# Patient Record
Sex: FEMALE | Race: WHITE | NOT HISPANIC OR LATINO | Employment: OTHER | URBAN - METROPOLITAN AREA
[De-identification: names, ages, dates, MRNs, and addresses within clinical notes are randomized per-mention and may not be internally consistent; named-entity substitution may affect disease eponyms.]

---

## 2023-04-21 ENCOUNTER — TELEPHONE (OUTPATIENT)
Dept: PRIMARY CARE | Facility: CLINIC | Age: 73
End: 2023-04-21

## 2023-04-24 ENCOUNTER — OFFICE VISIT (OUTPATIENT)
Dept: PRIMARY CARE | Facility: CLINIC | Age: 73
End: 2023-04-24
Payer: MEDICARE

## 2023-04-24 VITALS
OXYGEN SATURATION: 97 % | SYSTOLIC BLOOD PRESSURE: 178 MMHG | HEART RATE: 80 BPM | BODY MASS INDEX: 28.53 KG/M2 | WEIGHT: 156 LBS | DIASTOLIC BLOOD PRESSURE: 88 MMHG

## 2023-04-24 DIAGNOSIS — I10 PRIMARY HYPERTENSION: ICD-10-CM

## 2023-04-24 DIAGNOSIS — N18.31 STAGE 3A CHRONIC KIDNEY DISEASE (MULTI): Primary | ICD-10-CM

## 2023-04-24 PROBLEM — M81.0 POST-MENOPAUSAL OSTEOPOROSIS: Status: ACTIVE | Noted: 2023-04-24

## 2023-04-24 PROBLEM — F43.21 SITUATIONAL DEPRESSION: Status: ACTIVE | Noted: 2023-04-24

## 2023-04-24 PROBLEM — R01.1 CARDIAC MURMUR: Status: ACTIVE | Noted: 2023-04-24

## 2023-04-24 PROBLEM — E78.5 BORDERLINE HYPERLIPIDEMIA: Status: ACTIVE | Noted: 2023-04-24

## 2023-04-24 PROBLEM — N95.1 HOT FLASHES, MENOPAUSAL: Status: ACTIVE | Noted: 2023-04-24

## 2023-04-24 LAB
ALBUMIN (G/DL) IN SER/PLAS: 4.7 G/DL (ref 3.4–5)
ANION GAP IN SER/PLAS: 13 MMOL/L (ref 10–20)
CALCIUM (MG/DL) IN SER/PLAS: 10.3 MG/DL (ref 8.6–10.6)
CARBON DIOXIDE, TOTAL (MMOL/L) IN SER/PLAS: 29 MMOL/L (ref 21–32)
CHLORIDE (MMOL/L) IN SER/PLAS: 102 MMOL/L (ref 98–107)
CREATININE (MG/DL) IN SER/PLAS: 0.97 MG/DL (ref 0.5–1.05)
GFR FEMALE: 62 ML/MIN/1.73M2
GLUCOSE (MG/DL) IN SER/PLAS: 104 MG/DL (ref 74–99)
PHOSPHATE (MG/DL) IN SER/PLAS: 3.3 MG/DL (ref 2.5–4.9)
POTASSIUM (MMOL/L) IN SER/PLAS: 4 MMOL/L (ref 3.5–5.3)
SODIUM (MMOL/L) IN SER/PLAS: 140 MMOL/L (ref 136–145)
UREA NITROGEN (MG/DL) IN SER/PLAS: 20 MG/DL (ref 6–23)

## 2023-04-24 PROCEDURE — 3077F SYST BP >= 140 MM HG: CPT | Performed by: STUDENT IN AN ORGANIZED HEALTH CARE EDUCATION/TRAINING PROGRAM

## 2023-04-24 PROCEDURE — 1160F RVW MEDS BY RX/DR IN RCRD: CPT | Performed by: STUDENT IN AN ORGANIZED HEALTH CARE EDUCATION/TRAINING PROGRAM

## 2023-04-24 PROCEDURE — 80069 RENAL FUNCTION PANEL: CPT

## 2023-04-24 PROCEDURE — 3079F DIAST BP 80-89 MM HG: CPT | Performed by: STUDENT IN AN ORGANIZED HEALTH CARE EDUCATION/TRAINING PROGRAM

## 2023-04-24 PROCEDURE — 36415 COLL VENOUS BLD VENIPUNCTURE: CPT | Performed by: STUDENT IN AN ORGANIZED HEALTH CARE EDUCATION/TRAINING PROGRAM

## 2023-04-24 PROCEDURE — 99213 OFFICE O/P EST LOW 20 MIN: CPT | Performed by: STUDENT IN AN ORGANIZED HEALTH CARE EDUCATION/TRAINING PROGRAM

## 2023-04-24 PROCEDURE — 1159F MED LIST DOCD IN RCRD: CPT | Performed by: STUDENT IN AN ORGANIZED HEALTH CARE EDUCATION/TRAINING PROGRAM

## 2023-04-24 RX ORDER — LOSARTAN POTASSIUM AND HYDROCHLOROTHIAZIDE 25; 100 MG/1; MG/1
1 TABLET ORAL DAILY
Qty: 90 TABLET | Refills: 3 | Status: SHIPPED | OUTPATIENT
Start: 2023-04-24 | End: 2024-01-03 | Stop reason: SDUPTHER

## 2023-04-24 RX ORDER — HYDRALAZINE HYDROCHLORIDE 25 MG/1
25 TABLET, FILM COATED ORAL 3 TIMES DAILY PRN
Qty: 90 TABLET | Refills: 0 | Status: SHIPPED | OUTPATIENT
Start: 2023-04-24 | End: 2024-04-23

## 2023-04-24 NOTE — PATIENT INSTRUCTIONS
Thank you for coming today!    I have increased your losartan-hydrochlorothiazide to 100-25 daily.    You can take hydralazine as needed for systolic BP over 200. If you are using this, please let us know.    Please get your blood work done,    We will see you in 2 weeks!

## 2023-04-24 NOTE — PROGRESS NOTES
Subjective   Patient ID: Merna Edgar is a 72 y.o. female patient of Dr. Oakes with history of epigastric pain, elevated BP, HLD, fibrocystic breast disease, cardiac murmur, CKD stage IIIa, hot flashes, and osteopenia who presents for Hypertension.    [facility]    Does not measure her blood pressure at home. Currently on losartan-hydrochlorothiazide (50-12.5). States that she felt like her blood pressure has been high. She went out of town for a couple days. She felt tired and was not sleeping well. She went to exercise class on Tuesday and felt like it was harder than usual. In the past, she has had headache and nausea with elevated BP. She started having some nausea, so Wednesday took BP and it was 195 systolic. On Thursday it was 205 systolic. Laid low over the weekend and took BP a few times a day. Gets different measurements in left and right arms for past year. L is usually higher than R. Her measurements on the left arm [id number] systolic, 60s-100s diastolic. Right arm is often 130s-140s systolic and 50s-60s diastolic.    Denies chest pain and shortness of breath. Sometimes gets a little bit of headache. Gets nauseous.     Still gets hot flashes.    L foot slightly more swollen than R foot at baseline. No changes.     On repeat blood pressure:  R 168/100  L 178/88    Objective   Visit Vitals  /88 (BP Location: Left arm)   Pulse 80   Wt 70.8 kg (156 lb)   SpO2 97%   BMI 28.53 kg/m²   BSA 1.76 m²      Physical Exam  General: Well appearing, conversational, in no acute distress  HEENT: EOMI, PERRL, nares patent without congestion, MMM  CV: RRR, loud systolic murmur   Resp: Lungs CTAB, normal work of breathing  GI: Soft, nondistended, nontender, BS+   Ext: Very slight L foot edema without warmth or erythema, no R foot edema   Skin: Warm, dry, no rashes  Neuro: Awake, alert, oriented x3, moving all 4 extremities, nonfocal, normal gait, ambulates without assistance  Psych: Appropriate mood and affect       Assessment/Plan   Merna Edgar is a 72 y.o. female patient of Dr. Oakes with history of epigastric pain, elevated BP, HLD, fibrocystic breast disease, cardiac murmur, CKD stage IIIa, hot flashes, and osteopenia who presents for elevated BP over past week with symptoms of nausea and occasional headache. Denies chest pain, shortness of breath. Feeling well today. Her BP was 168/100 in R arm and 178/88 in L arm after sitting, so remained elevated throughout visit.     -Repeat renal panel to ensure renal disease did not progress  -Increase losartan-hydrochlorothiazide to 100/25--- will recheck BP and lab in a couple weeks' follow up  -Of note- patient gets hot flashes-- unlikely pheo, given that she does not have panic attack symptoms or frequent headache, but could consider work-up if blood pressure remains elevated     Problem List Items Addressed This Visit    None  Visit Diagnoses       Stage 3a chronic kidney disease    -  Primary    Relevant Orders    Renal function panel (Completed)    Primary hypertension        Relevant Medications    losartan-hydrochlorothiazide (Hyzaar) 100-25 mg tablet    hydrALAZINE (Apresoline) 25 mg tablet                 Anni Donovan MD MPH

## 2023-05-11 ENCOUNTER — OFFICE VISIT (OUTPATIENT)
Dept: PRIMARY CARE | Facility: CLINIC | Age: 73
End: 2023-05-11
Payer: MEDICARE

## 2023-05-11 VITALS
WEIGHT: 159.6 LBS | DIASTOLIC BLOOD PRESSURE: 70 MMHG | SYSTOLIC BLOOD PRESSURE: 140 MMHG | HEART RATE: 75 BPM | BODY MASS INDEX: 29.19 KG/M2

## 2023-05-11 DIAGNOSIS — N18.31 CHRONIC RENAL IMPAIRMENT, STAGE 3A (MULTI): ICD-10-CM

## 2023-05-11 DIAGNOSIS — I10 BENIGN ESSENTIAL HYPERTENSION: Primary | ICD-10-CM

## 2023-05-11 PROBLEM — R07.81 RIB PAIN ON LEFT SIDE: Status: RESOLVED | Noted: 2023-05-11 | Resolved: 2023-05-11

## 2023-05-11 PROBLEM — H25.11 AGE-RELATED NUCLEAR CATARACT OF RIGHT EYE: Status: ACTIVE | Noted: 2023-05-11

## 2023-05-11 PROBLEM — H25.12 AGE-RELATED NUCLEAR CATARACT OF LEFT EYE: Status: ACTIVE | Noted: 2023-05-11

## 2023-05-11 PROBLEM — N90.5 VULVAR ATROPHY: Status: ACTIVE | Noted: 2023-05-11

## 2023-05-11 PROBLEM — G89.29 CHRONIC LEFT HIP PAIN: Status: ACTIVE | Noted: 2023-05-11

## 2023-05-11 PROBLEM — M62.838 MUSCLE SPASM OF BOTH LOWER LEGS: Status: RESOLVED | Noted: 2023-05-11 | Resolved: 2023-05-11

## 2023-05-11 PROBLEM — R10.13 EPIGASTRIC PAIN: Status: RESOLVED | Noted: 2023-05-11 | Resolved: 2023-05-11

## 2023-05-11 PROBLEM — H52.00 HYPEROPIA WITH ASTIGMATISM AND PRESBYOPIA: Status: ACTIVE | Noted: 2023-05-11

## 2023-05-11 PROBLEM — H52.209 HYPEROPIA WITH ASTIGMATISM AND PRESBYOPIA: Status: ACTIVE | Noted: 2023-05-11

## 2023-05-11 PROBLEM — H52.4 HYPEROPIA WITH ASTIGMATISM AND PRESBYOPIA: Status: ACTIVE | Noted: 2023-05-11

## 2023-05-11 PROBLEM — G47.00 INSOMNIA: Status: ACTIVE | Noted: 2023-05-11

## 2023-05-11 PROBLEM — N60.19 BREAST CHANGES, FIBROCYSTIC: Status: RESOLVED | Noted: 2023-05-11 | Resolved: 2023-05-11

## 2023-05-11 PROBLEM — M48.061 LUMBAR STENOSIS: Status: ACTIVE | Noted: 2023-05-11

## 2023-05-11 PROBLEM — M25.552 CHRONIC LEFT HIP PAIN: Status: ACTIVE | Noted: 2023-05-11

## 2023-05-11 PROBLEM — R92.8 ABNORMAL FINDING ON BREAST IMAGING: Status: RESOLVED | Noted: 2023-05-11 | Resolved: 2023-05-11

## 2023-05-11 LAB
ANION GAP IN SER/PLAS: 16 MMOL/L (ref 10–20)
CALCIUM (MG/DL) IN SER/PLAS: 10.2 MG/DL (ref 8.6–10.6)
CARBON DIOXIDE, TOTAL (MMOL/L) IN SER/PLAS: 27 MMOL/L (ref 21–32)
CHLORIDE (MMOL/L) IN SER/PLAS: 99 MMOL/L (ref 98–107)
CREATININE (MG/DL) IN SER/PLAS: 1.07 MG/DL (ref 0.5–1.05)
GFR FEMALE: 55 ML/MIN/1.73M2
GLUCOSE (MG/DL) IN SER/PLAS: 85 MG/DL (ref 74–99)
POTASSIUM (MMOL/L) IN SER/PLAS: 3.8 MMOL/L (ref 3.5–5.3)
SODIUM (MMOL/L) IN SER/PLAS: 138 MMOL/L (ref 136–145)
UREA NITROGEN (MG/DL) IN SER/PLAS: 16 MG/DL (ref 6–23)

## 2023-05-11 PROCEDURE — 3078F DIAST BP <80 MM HG: CPT | Performed by: INTERNAL MEDICINE

## 2023-05-11 PROCEDURE — 1036F TOBACCO NON-USER: CPT | Performed by: INTERNAL MEDICINE

## 2023-05-11 PROCEDURE — 99213 OFFICE O/P EST LOW 20 MIN: CPT | Performed by: INTERNAL MEDICINE

## 2023-05-11 PROCEDURE — 1159F MED LIST DOCD IN RCRD: CPT | Performed by: INTERNAL MEDICINE

## 2023-05-11 PROCEDURE — 80048 BASIC METABOLIC PNL TOTAL CA: CPT

## 2023-05-11 PROCEDURE — 3077F SYST BP >= 140 MM HG: CPT | Performed by: INTERNAL MEDICINE

## 2023-05-11 PROCEDURE — 1160F RVW MEDS BY RX/DR IN RCRD: CPT | Performed by: INTERNAL MEDICINE

## 2023-05-11 RX ORDER — MELOXICAM 7.5 MG/1
1 TABLET ORAL 2 TIMES DAILY PRN
COMMUNITY
Start: 2020-02-27

## 2023-05-11 RX ORDER — GABAPENTIN 100 MG/1
CAPSULE ORAL
COMMUNITY
Start: 2020-01-17

## 2023-05-11 RX ORDER — OMEPRAZOLE 20 MG/1
20 CAPSULE, DELAYED RELEASE ORAL
COMMUNITY
End: 2023-12-29

## 2023-05-11 ASSESSMENT — ENCOUNTER SYMPTOMS
HEADACHES: 1
FATIGUE: 1
ACTIVITY CHANGE: 0
NUMBNESS: 0
WEAKNESS: 0
WHEEZING: 0
DIZZINESS: 0
FEVER: 0
COUGH: 0
PALPITATIONS: 0
CHEST TIGHTNESS: 0
SHORTNESS OF BREATH: 0

## 2023-05-11 NOTE — PATIENT INSTRUCTIONS
I am very pleased to see that your blood pressure readings have improved tremendously on the higher dose of therapy.  I would encourage you to continue on this higher dose.  Let us know if you need refills.  We will update your kidney function testing to ensure that this remains stable with the change in dosing.  If you have any questions, please contact us.  If all remains well, we will otherwise plan on simply seeing you back in the fall for your wellness visit.

## 2023-05-11 NOTE — PROGRESS NOTES
Merna Edgar comes in today for a comprehensive follow up.      Ms. Edgar comes in today for a comprehensive follow up.  She was seen about 1 month ago with elevated blood pressure readings.  Losartan HCT was increased to 100/25 mg daily.  She has had extremely labile blood pressures at home on her home monitor.  She does bring this in for comparison today.  She feels a bit tired on the higher dose of medication.  She feels as though her pulse rate has decreased, she is not certain why.  She feels tired going to the gym but not with any chest pain nor change in breathing.  She is concerned about the effect of the higher dose of diuretic.        Review of Systems   Constitutional:  Positive for fatigue. Negative for activity change and fever.   Respiratory:  Negative for cough, chest tightness, shortness of breath and wheezing.    Cardiovascular:  Negative for chest pain, palpitations and leg swelling.   Neurological:  Positive for headaches. Negative for dizziness, syncope, weakness and numbness.       Objective   Physical Exam  Constitutional:       Appearance: Normal appearance.   Cardiovascular:      Rate and Rhythm: Normal rate and regular rhythm.      Heart sounds: Murmur (2/6 SM throughout sternal border) heard.   Neurological:      Mental Status: She is alert.         Assessment/Plan   Assessment and plan  1.  Hypertension: Much better control on current dosing.  We have manually checked on 2 separate occasions, both reasonable.  Her initial check with her monitor when self checking, showed severely elevated readings of 199/79.  When corrected on how to place the cuff appropriately, this promptly came down to 139/73, very consistent with our readings.  Question is whether this was all due to placement of the cuff, vs erroneous readings of her monitor altogether.  We have discussed that it may be best at this stage to not check her blood pressure readings consistently at home, as she does get quite  anxious with this.  We will update a BMP today on the higher dose of medicine.  I have encouraged her to remain on this current dose, as this seems to be very effective.  If she has increasing fatigue, dizziness, or any chest pressure, she needs to immediately contact us so that we can proceed with further cardiac screening.  She voices understanding.  If all remains well, we will simply see her back in the fall for her wellness visit.  Problem List Items Addressed This Visit    None

## 2023-10-06 DIAGNOSIS — Z12.31 VISIT FOR SCREENING MAMMOGRAM: Primary | ICD-10-CM

## 2023-11-15 ENCOUNTER — ANCILLARY PROCEDURE (OUTPATIENT)
Dept: RADIOLOGY | Facility: CLINIC | Age: 73
End: 2023-11-15
Payer: MEDICARE

## 2023-11-15 DIAGNOSIS — Z12.31 VISIT FOR SCREENING MAMMOGRAM: ICD-10-CM

## 2023-11-15 PROCEDURE — 77063 BREAST TOMOSYNTHESIS BI: CPT

## 2023-11-15 PROCEDURE — 77063 BREAST TOMOSYNTHESIS BI: CPT | Performed by: RADIOLOGY

## 2023-11-15 PROCEDURE — 77067 SCR MAMMO BI INCL CAD: CPT | Performed by: RADIOLOGY

## 2023-11-17 ENCOUNTER — TELEPHONE (OUTPATIENT)
Dept: PRIMARY CARE | Facility: CLINIC | Age: 73
End: 2023-11-17

## 2023-11-17 DIAGNOSIS — R92.8 ABNORMAL SCREENING MAMMOGRAM: Primary | ICD-10-CM

## 2023-11-27 ENCOUNTER — OFFICE VISIT (OUTPATIENT)
Dept: SURGICAL ONCOLOGY | Facility: CLINIC | Age: 73
End: 2023-11-27
Payer: MEDICARE

## 2023-11-27 ENCOUNTER — ANCILLARY PROCEDURE (OUTPATIENT)
Dept: RADIOLOGY | Facility: CLINIC | Age: 73
End: 2023-11-27
Payer: MEDICARE

## 2023-11-27 VITALS
HEART RATE: 72 BPM | OXYGEN SATURATION: 99 % | RESPIRATION RATE: 18 BRPM | SYSTOLIC BLOOD PRESSURE: 181 MMHG | BODY MASS INDEX: 29.04 KG/M2 | DIASTOLIC BLOOD PRESSURE: 75 MMHG | TEMPERATURE: 97.9 F | WEIGHT: 158.8 LBS

## 2023-11-27 DIAGNOSIS — R92.8 ABNORMAL FINDING ON BREAST IMAGING: Primary | ICD-10-CM

## 2023-11-27 DIAGNOSIS — R92.8 ABNORMAL SCREENING MAMMOGRAM: ICD-10-CM

## 2023-11-27 PROCEDURE — G0279 TOMOSYNTHESIS, MAMMO: HCPCS | Mod: RIGHT SIDE | Performed by: RADIOLOGY

## 2023-11-27 PROCEDURE — 77065 DX MAMMO INCL CAD UNI: CPT | Mod: RIGHT SIDE | Performed by: RADIOLOGY

## 2023-11-27 PROCEDURE — 1160F RVW MEDS BY RX/DR IN RCRD: CPT | Performed by: NURSE PRACTITIONER

## 2023-11-27 PROCEDURE — 77061 BREAST TOMOSYNTHESIS UNI: CPT | Mod: RT

## 2023-11-27 PROCEDURE — 99214 OFFICE O/P EST MOD 30 MIN: CPT | Performed by: NURSE PRACTITIONER

## 2023-11-27 PROCEDURE — 3078F DIAST BP <80 MM HG: CPT | Performed by: NURSE PRACTITIONER

## 2023-11-27 PROCEDURE — 1126F AMNT PAIN NOTED NONE PRSNT: CPT | Performed by: NURSE PRACTITIONER

## 2023-11-27 PROCEDURE — 1159F MED LIST DOCD IN RCRD: CPT | Performed by: NURSE PRACTITIONER

## 2023-11-27 PROCEDURE — 3077F SYST BP >= 140 MM HG: CPT | Performed by: NURSE PRACTITIONER

## 2023-11-27 PROCEDURE — 76642 ULTRASOUND BREAST LIMITED: CPT | Mod: RT

## 2023-11-27 PROCEDURE — 1036F TOBACCO NON-USER: CPT | Performed by: NURSE PRACTITIONER

## 2023-11-27 PROCEDURE — 76642 ULTRASOUND BREAST LIMITED: CPT | Mod: RIGHT SIDE | Performed by: RADIOLOGY

## 2023-11-27 ASSESSMENT — PAIN SCALES - GENERAL: PAINLEVEL: 0-NO PAIN

## 2023-11-27 NOTE — PATIENT INSTRUCTIONS
Thank you for coming to see me today at [facility]. I recommend biopsy. A breast radiology physician will perform the biopsy. Possible diagnoses include benign, atypical, or cancer as we discussed.  Bruising and mild discomfort after the biopsy is normal and will improve. I normally have results in 7 business days. I will call you with results, please have your phone handy to take my call.    IMPORTANT INFORMATION REGARDING YOUR RESULTS  If you receive medical information from My [facility] Personal Health Record (online chart) your results will be released into your chart. This means you may view or see results of your biopsy or procedure before I contact you directly. If this occurs, please call the office and we will discuss your results over the phone.     You can see your health information, review clinical summaries from office visits & test results online when you follow your health with MY  Chart, a personal health record. To sign up go to [email or web address]. If you need assistance with signing up or trouble getting into your account call [employer] Patient Line 24/7 at [phone].     Should you have any questions or concerns after biopsy, please do not hesitate to call my office at [phone]. If it has been more than a week since your biopsy was performed and you have not been given the results, please call my office [phone]. Thank you for choosing [facility] and trusting me as your healthcare provider. I am honored to be a provider on your health care team and I remain dedicated to helping you achieve your health goals.

## 2023-11-27 NOTE — PROGRESS NOTES
Merna Edgar female   1950 72 y.o.   [id number]      Chief Complaint    New Patient Visit; Breast Cancer          HPI  Merna Edgar is a 72 y.o. [place] woman [facility] for [facility]. She has been follow by Dr Donnie Clemons for years for benign breast issues. She has history of 10/2022 left benign biopsy with [facility].  She has history of breast surgery for mastitis in .     BREAST IMAGIN/15/2023 bilateral screening mammogram, BI-RADS Category 0, right focal asymmetry. 2023 right diagnostic mammogram and ultrasound, BI-RADS Category 4, right mass without ultrasound correlate, stereotactic biopsy is indicated.    FEMALE HISTORY: menarche age 11, , first birth age 25, OCPs 5-10 years, menopause age 52, heterogeneously dense breast tissue, previous biopsies    FAMILY CANCER HISTORY: None      REVIEW OF SYSTEMS    Constitutional:  Negative for appetite change, fatigue, fever and unexpected weight change.   HENT:  Negative for ear pain, hearing loss, nosebleeds, sore throat and trouble swallowing.    Eyes:  Negative for discharge, itching and visual disturbance.   Respiratory:  Negative for cough, chest tightness and shortness of breath.    Cardiovascular:  Negative for chest pain, palpitations and leg swelling.   Breast: as indicated in HPI  Gastrointestinal:  Negative for abdominal pain, constipation, diarrhea and nausea.   Endocrine: Negative for cold intolerance and heat intolerance.   Genitourinary:  Negative for dysuria, frequency, hematuria, pelvic pain and vaginal bleeding.   Musculoskeletal:  Negative for arthralgias, back pain, gait problem, joint swelling and myalgias.   Skin:  Negative for color change and rash.   Allergic/Immunologic: Negative for environmental allergies and food allergies.   Neurological:  Negative for dizziness, tremors, speech difficulty, weakness, numbness and headaches.   Hematological:  Does not bruise/bleed  easily.   Psychiatric/Behavioral:  Negative for agitation, dysphoric mood and sleep disturbance. The patient is not nervous/anxious.         MEDICATIONS  Current Outpatient Medications   Medication Instructions    gabapentin (Neurontin) 100 mg capsule Daily RT    hydrALAZINE (APRESOLINE) 25 mg, oral, 3 times daily PRN    losartan-hydrochlorothiazide (Hyzaar) 100-25 mg tablet 1 tablet, oral, Daily    meloxicam (Mobic) 7.5 mg tablet 1 tablet, oral, 2 times daily PRN    omeprazole (PRILOSEC) 20 mg, oral        ALLERGIES  Allergies   Allergen Reactions    Penicillins Rash        SOCIAL HISTORY    Family History   Problem Relation Name Age of Onset    Asthma Mother           Social History     Tobacco Use    Smoking status: Never    Smokeless tobacco: Never   Substance Use Topics    Alcohol use: Not Currently        VITALS  Vitals:    11/27/23 1111   BP: (!) 181/75   Pulse: 72   Resp: 18   Temp: 36.6 °C (97.9 °F)   SpO2: 99%        PHYSICAL EXAM  Patient is alert and oriented x3, with appropriate mood. The gait is steady and hand grasps are equal. Sclera clear. The breasts are nearly symmetrical. The tissue is soft without palpable abnormalities, discrete nodules or masses. The skin and nipples appear normal. There is no cervical, supraclavicular, or axillary lymphadenopathy palpable. Heart rate and rhythm normal, S1 and S2 appreciated. The lungs are clear bilaterally. Abdomen is soft & non-tender.    Physical Exam  Chest:              IMAGING      Time was spent viewing digital images of the radiology testing with the patient. I explained the results in depth, along with suggested explanation for follow up recommendations based on the testing results.          ORDERS  Orders Placed This Encounter   Procedures    BI stereostatic guided breast right localization and biopsy     Standing Status:   Future     Standing Expiration Date:   1/27/2025     Order Specific Question:   Reason for exam:     Answer:   right     Order  Specific Question:   Radiologist to Determine Optimal Study     Answer:   Yes     Order Specific Question:   Release result to Rise     Answer:   Immediate     Order Specific Question:   Is this exam part of a Research Study? If Yes, link this order to the research study     Answer:   No           ASSESSMENT/PLAN  1. Abnormal finding on breast imaging  BI stereostatic guided breast right localization and biopsy       Proceed to biopsy. A breast radiology physician will perform the biopsy. Results are usually available in 3-7 business days. I will call patient with results and instruct on next steps and plan.         Regi Drew, MARK-[facility]

## 2023-11-28 ENCOUNTER — APPOINTMENT (OUTPATIENT)
Dept: RADIOLOGY | Facility: CLINIC | Age: 73
End: 2023-11-28
Payer: MEDICARE

## 2023-11-30 ENCOUNTER — TELEPHONE (OUTPATIENT)
Dept: RADIOLOGY | Facility: CLINIC | Age: 73
End: 2023-11-30
Payer: MEDICARE

## 2023-12-01 ENCOUNTER — ANCILLARY PROCEDURE (OUTPATIENT)
Dept: RADIOLOGY | Facility: CLINIC | Age: 73
End: 2023-12-01
Payer: MEDICARE

## 2023-12-01 DIAGNOSIS — R92.8 OTHER ABNORMAL AND INCONCLUSIVE FINDINGS ON DIAGNOSTIC IMAGING OF BREAST: ICD-10-CM

## 2023-12-01 DIAGNOSIS — R92.8 ABNORMAL FINDING ON BREAST IMAGING: Primary | ICD-10-CM

## 2023-12-01 PROCEDURE — 19081 BX BREAST 1ST LESION STRTCTC: CPT | Mod: RIGHT SIDE | Performed by: RADIOLOGY

## 2023-12-01 PROCEDURE — 2780000003 HC OR 278 NO HCPCS

## 2023-12-01 PROCEDURE — 2720000007 HC OR 272 NO HCPCS

## 2023-12-01 PROCEDURE — A4648 IMPLANTABLE TISSUE MARKER: HCPCS

## 2023-12-01 PROCEDURE — 2500000005 HC RX 250 GENERAL PHARMACY W/O HCPCS: Performed by: RADIOLOGY

## 2023-12-01 PROCEDURE — 77065 DX MAMMO INCL CAD UNI: CPT | Mod: RT

## 2023-12-01 PROCEDURE — 96372 THER/PROPH/DIAG INJ SC/IM: CPT | Performed by: RADIOLOGY

## 2023-12-01 PROCEDURE — 19081 BX BREAST 1ST LESION STRTCTC: CPT | Mod: RT

## 2023-12-01 PROCEDURE — 88305 TISSUE EXAM BY PATHOLOGIST: CPT | Performed by: STUDENT IN AN ORGANIZED HEALTH CARE EDUCATION/TRAINING PROGRAM

## 2023-12-01 PROCEDURE — 88305 TISSUE EXAM BY PATHOLOGIST: CPT | Mod: TC,SUR,AHULAB | Performed by: NURSE PRACTITIONER

## 2023-12-01 PROCEDURE — 77065 DX MAMMO INCL CAD UNI: CPT | Mod: RIGHT SIDE | Performed by: RADIOLOGY

## 2023-12-01 RX ADMIN — Medication 15 ML: at 10:20

## 2023-12-01 ASSESSMENT — PAIN SCALES - GENERAL
PAINLEVEL_OUTOF10: 0 - NO PAIN
PAINLEVEL_OUTOF10: 2
PAINLEVEL_OUTOF10: 4
PAINLEVEL_OUTOF10: 0 - NO PAIN

## 2023-12-01 ASSESSMENT — PAIN - FUNCTIONAL ASSESSMENT: PAIN_FUNCTIONAL_ASSESSMENT: 0-10

## 2023-12-01 NOTE — DISCHARGE INSTRUCTIONS
1000 Procedural steps explained and patient given opportunity to verbalize concerns and seek clarification.  Post procedure self-care and potential for bruising , hematoma, and pain reviewed.  Patient verbalizes understanding.   1050 AFTER THE TEST  A steri-strip and bandage will be placed over the incision. You may shower after 24 hours. Remove bandage after 24 hours. Remove bandage after the shower. Leave the steri-strips in place to fall off on their own. If after 1 week the steri-strips are still on, you may remove them. Avoid swimming or soaking in tub for 3 days.     You may have mild discomfort at the test site. If needed, you may take Tylenol (Acetaminophen) for pain. Please avoid taking NSAIDs, Motrin, Advil, Aleve, or ibuprofen for 24 hours following the biopsy. After 24 hours you may resume NSAIDSs.     If you take aspirin, Plavix, Coumadin, Xarelto or Eliquis please tell us. If these medications were stopped by your provider, please ask them when to resume.     You may have some tenderness, bruising or slight bleeding at the site. Please apply ice packs to the site for 15 minutes on and 15 minutes off for a 2 hour minimum.     Most people can return to their usual routine after the procedure. Avoid Strenuous activity for 24 hours.     Sleep in a bra the night after your biopsy. Continue to do so for comfort.     Call your doctor if you have any of the following symptoms :  Fever  Increased pain  Increased bleeding  Redness  Increased swelling  Yellowish drainage  Your doctor will get the biopsy results within 5 - 7 days. Call your doctor with any questions.     Patient education brochure and pain/comfort measures have been reviewed.   Phone number provided to contact [facility] if problems arise.     Patient verbalized understanding of home going instructions.

## 2023-12-06 LAB
LABORATORY COMMENT REPORT: NORMAL
PATH REPORT.FINAL DX SPEC: NORMAL
PATH REPORT.GROSS SPEC: NORMAL
PATH REPORT.RELEVANT HX SPEC: NORMAL
PATH REPORT.TOTAL CANCER: NORMAL

## 2023-12-07 DIAGNOSIS — R92.8 ABNORMAL FINDING ON BREAST IMAGING: Primary | ICD-10-CM

## 2023-12-08 ENCOUNTER — TELEPHONE (OUTPATIENT)
Dept: RADIOLOGY | Facility: CLINIC | Age: 73
End: 2023-12-08
Payer: MEDICARE

## 2023-12-18 ENCOUNTER — TELEPHONE (OUTPATIENT)
Dept: SURGICAL ONCOLOGY | Facility: CLINIC | Age: 73
End: 2023-12-18
Payer: MEDICARE

## 2023-12-18 NOTE — TELEPHONE ENCOUNTER
Received call from Merna- breast biopsy on 12/1. Patient voiced that she had a large, black hematoma post biopsy. While that bruising has improved, she still has a large hardened lump. We discussed hematoma sxs. Light massage and warm compress encouraged. Merna was told to give it some time to re-absorb, and call back with any concerns.

## 2023-12-29 DIAGNOSIS — K21.9 GASTROESOPHAGEAL REFLUX DISEASE, UNSPECIFIED WHETHER ESOPHAGITIS PRESENT: ICD-10-CM

## 2023-12-29 RX ORDER — OMEPRAZOLE 20 MG/1
20 CAPSULE, DELAYED RELEASE ORAL
Qty: 30 CAPSULE | Refills: 0 | Status: SHIPPED | OUTPATIENT
Start: 2023-12-29

## 2024-01-03 DIAGNOSIS — I10 PRIMARY HYPERTENSION: ICD-10-CM

## 2024-01-03 RX ORDER — LOSARTAN POTASSIUM AND HYDROCHLOROTHIAZIDE 25; 100 MG/1; MG/1
1 TABLET ORAL DAILY
Qty: 30 TABLET | Refills: 0 | Status: SHIPPED | OUTPATIENT
Start: 2024-01-03 | End: 2024-01-25 | Stop reason: SDUPTHER

## 2024-01-25 ENCOUNTER — OFFICE VISIT (OUTPATIENT)
Dept: PRIMARY CARE | Facility: CLINIC | Age: 74
End: 2024-01-25
Payer: MEDICARE

## 2024-01-25 ENCOUNTER — LAB (OUTPATIENT)
Dept: LAB | Facility: LAB | Age: 74
End: 2024-01-25
Payer: MEDICARE

## 2024-01-25 VITALS
WEIGHT: 159.8 LBS | SYSTOLIC BLOOD PRESSURE: 150 MMHG | DIASTOLIC BLOOD PRESSURE: 70 MMHG | BODY MASS INDEX: 29.4 KG/M2 | HEIGHT: 62 IN

## 2024-01-25 DIAGNOSIS — R09.89 BILATERAL CAROTID BRUITS: ICD-10-CM

## 2024-01-25 DIAGNOSIS — Z00.00 ROUTINE GENERAL MEDICAL EXAMINATION AT A HEALTH CARE FACILITY: Primary | ICD-10-CM

## 2024-01-25 DIAGNOSIS — N18.31 CHRONIC RENAL IMPAIRMENT, STAGE 3A (MULTI): ICD-10-CM

## 2024-01-25 DIAGNOSIS — I10 PRIMARY HYPERTENSION: Primary | ICD-10-CM

## 2024-01-25 DIAGNOSIS — R01.1 CARDIAC MURMUR: ICD-10-CM

## 2024-01-25 DIAGNOSIS — M48.062 SPINAL STENOSIS OF LUMBAR REGION WITH NEUROGENIC CLAUDICATION: ICD-10-CM

## 2024-01-25 DIAGNOSIS — E78.5 BORDERLINE HYPERLIPIDEMIA: ICD-10-CM

## 2024-01-25 DIAGNOSIS — I10 BENIGN ESSENTIAL HYPERTENSION: ICD-10-CM

## 2024-01-25 DIAGNOSIS — Z78.0 POSTMENOPAUSAL ESTROGEN DEFICIENCY: ICD-10-CM

## 2024-01-25 DIAGNOSIS — Z00.00 ROUTINE GENERAL MEDICAL EXAMINATION AT HEALTH CARE FACILITY: ICD-10-CM

## 2024-01-25 DIAGNOSIS — M81.0 POST-MENOPAUSAL OSTEOPOROSIS: ICD-10-CM

## 2024-01-25 LAB
25(OH)D3 SERPL-MCNC: 88 NG/ML (ref 30–100)
ALBUMIN SERPL BCP-MCNC: 4.4 G/DL (ref 3.4–5)
ALP SERPL-CCNC: 78 U/L (ref 33–136)
ALT SERPL W P-5'-P-CCNC: 13 U/L (ref 7–45)
ANION GAP SERPL CALC-SCNC: 13 MMOL/L (ref 10–20)
AST SERPL W P-5'-P-CCNC: 16 U/L (ref 9–39)
BASOPHILS # BLD AUTO: 0.05 X10*3/UL (ref 0–0.1)
BASOPHILS NFR BLD AUTO: 0.6 %
BILIRUB SERPL-MCNC: 0.9 MG/DL (ref 0–1.2)
BUN SERPL-MCNC: 15 MG/DL (ref 6–23)
CALCIUM SERPL-MCNC: 10.6 MG/DL (ref 8.6–10.6)
CHLORIDE SERPL-SCNC: 100 MMOL/L (ref 98–107)
CHOLEST SERPL-MCNC: 216 MG/DL (ref 0–199)
CHOLESTEROL/HDL RATIO: 3.1
CK SERPL-CCNC: 48 U/L (ref 0–215)
CO2 SERPL-SCNC: 31 MMOL/L (ref 21–32)
CREAT SERPL-MCNC: 1.1 MG/DL (ref 0.5–1.05)
CREAT UR-MCNC: 45.3 MG/DL (ref 20–320)
EGFRCR SERPLBLD CKD-EPI 2021: 53 ML/MIN/1.73M*2
EOSINOPHIL # BLD AUTO: 0.07 X10*3/UL (ref 0–0.4)
EOSINOPHIL NFR BLD AUTO: 0.8 %
ERYTHROCYTE [DISTWIDTH] IN BLOOD BY AUTOMATED COUNT: 12.6 % (ref 11.5–14.5)
GLUCOSE SERPL-MCNC: 92 MG/DL (ref 74–99)
HCT VFR BLD AUTO: 41.9 % (ref 36–46)
HDLC SERPL-MCNC: 70.3 MG/DL
HGB BLD-MCNC: 13.3 G/DL (ref 12–16)
IMM GRANULOCYTES # BLD AUTO: 0.02 X10*3/UL (ref 0–0.5)
IMM GRANULOCYTES NFR BLD AUTO: 0.2 % (ref 0–0.9)
IRON SATN MFR SERPL: 27 % (ref 25–45)
IRON SERPL-MCNC: 94 UG/DL (ref 35–150)
LDLC SERPL CALC-MCNC: 126 MG/DL
LYMPHOCYTES # BLD AUTO: 1.85 X10*3/UL (ref 0.8–3)
LYMPHOCYTES NFR BLD AUTO: 22.1 %
MCH RBC QN AUTO: 27.3 PG (ref 26–34)
MCHC RBC AUTO-ENTMCNC: 31.7 G/DL (ref 32–36)
MCV RBC AUTO: 86 FL (ref 80–100)
MICROALBUMIN UR-MCNC: <7 MG/L
MICROALBUMIN/CREAT UR: NORMAL MG/G{CREAT}
MONOCYTES # BLD AUTO: 0.7 X10*3/UL (ref 0.05–0.8)
MONOCYTES NFR BLD AUTO: 8.4 %
NEUTROPHILS # BLD AUTO: 5.69 X10*3/UL (ref 1.6–5.5)
NEUTROPHILS NFR BLD AUTO: 67.9 %
NON HDL CHOLESTEROL: 146 MG/DL (ref 0–149)
NRBC BLD-RTO: 0 /100 WBCS (ref 0–0)
PLATELET # BLD AUTO: 237 X10*3/UL (ref 150–450)
POTASSIUM SERPL-SCNC: 3.8 MMOL/L (ref 3.5–5.3)
PROT SERPL-MCNC: 7.2 G/DL (ref 6.4–8.2)
RBC # BLD AUTO: 4.88 X10*6/UL (ref 4–5.2)
SODIUM SERPL-SCNC: 140 MMOL/L (ref 136–145)
TIBC SERPL-MCNC: 345 UG/DL (ref 240–445)
TRIGL SERPL-MCNC: 98 MG/DL (ref 0–149)
TSH SERPL-ACNC: 1.79 MIU/L (ref 0.44–3.98)
UIBC SERPL-MCNC: 251 UG/DL (ref 110–370)
URATE SERPL-MCNC: 5.7 MG/DL (ref 2.3–6.7)
VIT B12 SERPL-MCNC: 469 PG/ML (ref 211–911)
VLDL: 20 MG/DL (ref 0–40)
WBC # BLD AUTO: 8.4 X10*3/UL (ref 4.4–11.3)

## 2024-01-25 PROCEDURE — 80061 LIPID PANEL: CPT

## 2024-01-25 PROCEDURE — 82607 VITAMIN B-12: CPT

## 2024-01-25 PROCEDURE — 1126F AMNT PAIN NOTED NONE PRSNT: CPT | Performed by: INTERNAL MEDICINE

## 2024-01-25 PROCEDURE — 82306 VITAMIN D 25 HYDROXY: CPT

## 2024-01-25 PROCEDURE — 82570 ASSAY OF URINE CREATININE: CPT

## 2024-01-25 PROCEDURE — 82043 UR ALBUMIN QUANTITATIVE: CPT

## 2024-01-25 PROCEDURE — 83550 IRON BINDING TEST: CPT

## 2024-01-25 PROCEDURE — 85025 COMPLETE CBC W/AUTO DIFF WBC: CPT

## 2024-01-25 PROCEDURE — 99214 OFFICE O/P EST MOD 30 MIN: CPT | Performed by: INTERNAL MEDICINE

## 2024-01-25 PROCEDURE — 1160F RVW MEDS BY RX/DR IN RCRD: CPT | Performed by: INTERNAL MEDICINE

## 2024-01-25 PROCEDURE — 3077F SYST BP >= 140 MM HG: CPT | Performed by: INTERNAL MEDICINE

## 2024-01-25 PROCEDURE — 83540 ASSAY OF IRON: CPT

## 2024-01-25 PROCEDURE — 93000 ELECTROCARDIOGRAM COMPLETE: CPT | Performed by: INTERNAL MEDICINE

## 2024-01-25 PROCEDURE — 84443 ASSAY THYROID STIM HORMONE: CPT

## 2024-01-25 PROCEDURE — 1159F MED LIST DOCD IN RCRD: CPT | Performed by: INTERNAL MEDICINE

## 2024-01-25 PROCEDURE — 3078F DIAST BP <80 MM HG: CPT | Performed by: INTERNAL MEDICINE

## 2024-01-25 PROCEDURE — 84550 ASSAY OF BLOOD/URIC ACID: CPT

## 2024-01-25 PROCEDURE — 1170F FXNL STATUS ASSESSED: CPT | Performed by: INTERNAL MEDICINE

## 2024-01-25 PROCEDURE — 80053 COMPREHEN METABOLIC PANEL: CPT

## 2024-01-25 PROCEDURE — G0439 PPPS, SUBSEQ VISIT: HCPCS | Performed by: INTERNAL MEDICINE

## 2024-01-25 PROCEDURE — 36415 COLL VENOUS BLD VENIPUNCTURE: CPT

## 2024-01-25 PROCEDURE — 82550 ASSAY OF CK (CPK): CPT

## 2024-01-25 PROCEDURE — 1036F TOBACCO NON-USER: CPT | Performed by: INTERNAL MEDICINE

## 2024-01-25 RX ORDER — DESONIDE 0.5 MG/G
CREAM TOPICAL
COMMUNITY
Start: 2024-01-16

## 2024-01-25 ASSESSMENT — PATIENT HEALTH QUESTIONNAIRE - PHQ9
2. FEELING DOWN, DEPRESSED OR HOPELESS: NOT AT ALL
1. LITTLE INTEREST OR PLEASURE IN DOING THINGS: NOT AT ALL
SUM OF ALL RESPONSES TO PHQ9 QUESTIONS 1 AND 2: 0

## 2024-01-25 ASSESSMENT — ENCOUNTER SYMPTOMS
DIARRHEA: 0
POLYDIPSIA: 0
DIZZINESS: 0
EYE ITCHING: 0
MYALGIAS: 1
PALPITATIONS: 0
DYSPHORIC MOOD: 0
SINUS PRESSURE: 0
COLOR CHANGE: 0
NECK STIFFNESS: 0
SINUS PAIN: 0
COUGH: 0
ABDOMINAL DISTENTION: 0
ACTIVITY CHANGE: 0
NUMBNESS: 0
CHILLS: 0
HYPERACTIVE: 0
DEPRESSION: 0
HEADACHES: 0
CHEST TIGHTNESS: 0
BRUISES/BLEEDS EASILY: 0
OCCASIONAL FEELINGS OF UNSTEADINESS: 0
ABDOMINAL PAIN: 0
ARTHRALGIAS: 1
UNEXPECTED WEIGHT CHANGE: 0
EYE DISCHARGE: 0
DECREASED CONCENTRATION: 0
SORE THROAT: 0
SHORTNESS OF BREATH: 0
LOSS OF SENSATION IN FEET: 0
LIGHT-HEADEDNESS: 0
CONSTIPATION: 0
DYSURIA: 0
HEMATURIA: 0
WEAKNESS: 0
WHEEZING: 0
SLEEP DISTURBANCE: 0
VOICE CHANGE: 0
NERVOUS/ANXIOUS: 0
BACK PAIN: 1
RHINORRHEA: 0
FREQUENCY: 0
NECK PAIN: 0
FATIGUE: 0
NAUSEA: 0
DIAPHORESIS: 0
ADENOPATHY: 0
VOMITING: 0
JOINT SWELLING: 0

## 2024-01-25 ASSESSMENT — ACTIVITIES OF DAILY LIVING (ADL)
DOING_HOUSEWORK: INDEPENDENT
TAKING_MEDICATION: INDEPENDENT
BATHING: INDEPENDENT
MANAGING_FINANCES: INDEPENDENT
GROCERY_SHOPPING: INDEPENDENT
DRESSING: INDEPENDENT

## 2024-01-25 NOTE — PROGRESS NOTES
Subjective   Reason for Visit: Merna Edgar is an 73 y.o. female patient comes in today for comprehensive follow-up of medical conditions in conjunction with annual wellness visit    Ms. Edgar comes in today for comprehensive follow-up of medical conditions in conjunction with annual wellness visit, dictated in a separate note.  Please refer to that note for details on healthcare maintenance and screening studies.    Ms. Edgar comes in today for comprehensive follow-up with her medical wellness visit.  She states that she is feeling reasonably well, however she has had some pain in her right groin periodically.  This comes only for few seconds at a time and then resolves.  She is not certain whether this may be coming from her back.  She continues to have low back and right leg problems but this is manageable currently.  She stopped checking her blood pressures at home, stating that these always are elevated and she is not interested in getting more aggressive with treatment because she gets too dizzy from this.  She denies any chest pain.  She does have some questions regarding routine screening studies.  She has some occasional external ear pain on the right that is managed with hydrogen peroxide, she is wondering what the cause of this could be.  She denies any nausea, vomiting, nor changes in bowel or bladder habits.  She follows with gynecology as well as her breast specialist regularly.  She does try to keep up with most of her vaccines, but has declined COVID boosters and Shingrix series.        Patient Care Team:  Luz Maria Oakes MD as PCP - General  Luz Maria Oakes MD as PCP - [facility]ributed [facility]     Review of Systems   Constitutional:  Negative for activity change, chills, diaphoresis, fatigue and unexpected weight change.   HENT:  Positive for ear pain. Negative for congestion, ear discharge, postnasal drip, rhinorrhea, sinus pressure, sinus pain, sneezing, sore throat, tinnitus and voice change.   "  Eyes:  Negative for discharge, itching and visual disturbance.   Respiratory:  Negative for cough, chest tightness, shortness of breath and wheezing.    Cardiovascular:  Negative for chest pain, palpitations and leg swelling.   Gastrointestinal:  Negative for abdominal distention, abdominal pain, constipation, diarrhea, nausea and vomiting.   Endocrine: Negative for cold intolerance, heat intolerance, polydipsia and polyuria.   Genitourinary:  Negative for dysuria, frequency, hematuria, urgency, vaginal bleeding and vaginal discharge.   Musculoskeletal:  Positive for arthralgias, back pain and myalgias. Negative for gait problem, joint swelling, neck pain and neck stiffness.   Skin:  Negative for color change, pallor and rash.   Allergic/Immunologic: Negative for environmental allergies, food allergies and immunocompromised state.   Neurological:  Negative for dizziness, syncope, weakness, light-headedness, numbness and headaches.   Hematological:  Negative for adenopathy. Does not bruise/bleed easily.   Psychiatric/Behavioral:  Negative for behavioral problems, decreased concentration, dysphoric mood and sleep disturbance. The patient is not nervous/anxious and is not hyperactive.        Objective   Vitals:  /77   Ht 1.575 m (5' 2\")   Wt 72.5 kg (159 lb 12.8 oz)   LMP  (LMP Unknown)   BMI 29.23 kg/m²       Physical Exam  Constitutional:       General: She is not in acute distress.     Appearance: Normal appearance. She is well-developed. She is not ill-appearing or diaphoretic.   HENT:      Head: Normocephalic.      Right Ear: Tympanic membrane, ear canal and external ear normal. There is no impacted cerumen.      Left Ear: Tympanic membrane, ear canal and external ear normal. There is no impacted cerumen.      Nose: Nose normal. No congestion.      Mouth/Throat:      Mouth: Mucous membranes are moist.      Pharynx: Oropharynx is clear. No posterior oropharyngeal erythema.   Eyes:      General: Lids are " normal. No scleral icterus.     Extraocular Movements: Extraocular movements intact.      Conjunctiva/sclera: Conjunctivae normal.      Pupils: Pupils are equal, round, and reactive to light.   Neck:      Vascular: Carotid bruit (bilateral) present.   Cardiovascular:      Rate and Rhythm: Normal rate and regular rhythm.      Pulses: Normal pulses.      Heart sounds: Murmur (4/6 SM RUSB, 3/6 SM LLSB, apex) heard.      No gallop.   Pulmonary:      Effort: Pulmonary effort is normal. No respiratory distress.      Breath sounds: No wheezing, rhonchi or rales.   Chest:      Comments: Deferred to breast specialists  Abdominal:      General: Bowel sounds are normal. There is no distension.      Palpations: Abdomen is soft. There is no mass.      Tenderness: There is no abdominal tenderness. There is no guarding or rebound.   Genitourinary:     Comments: Deferred to gyn  Musculoskeletal:         General: No swelling or signs of injury.      Cervical back: Normal range of motion and neck supple. No tenderness.      Right lower leg: No edema.      Left lower leg: No edema.   Lymphadenopathy:      Cervical: No cervical adenopathy.   Skin:     General: Skin is warm and dry.      Coloration: Skin is not pale.      Findings: No bruising or rash.   Neurological:      General: No focal deficit present.      Mental Status: She is alert and oriented to person, place, and time.      Cranial Nerves: No cranial nerve deficit.      Motor: No weakness.      Gait: Gait normal.      Deep Tendon Reflexes: Reflexes normal.   Psychiatric:         Mood and Affect: Mood normal.         Behavior: Behavior normal.         Judgment: Judgment normal.         Assessment/Plan   Full age-appropriate comprehensive physical exam and health care maintenance performed and discussed today.  Routine safety and preventative measures discussed including self breast exam, seatbelt use, no drinking and driving, no texting and driving, abstinence or cessation of  tobacco use, routine dental and vision exams, healthy diet and regular exercise.    We will update comprehensive labs and follow-up on results once available.  Mammogram followed closely by breast specialist.  Next due in May.  Due for DEXA.  Requisition placed.  EKG as above.  Would like to proceed with cardiac calcium scoring scan.  Colonoscopy up-to-date from 2017, not needing to be repeated for total of 10 years.  Have discussed possibly repeat Prevnar 20 next year, has not been 5 years since her last Pneumovax.  Declines Shingrix nor COVID boosters.  Have counseled regarding tetanus vaccine recommendations and RSV.  Has received annual flu shot.    In addition to the above-mentioned healthcare maintenance and screening studies, the following were discussed and assessed in detail today:  1.  Hypertension: Some improvement on second check, but still elevated.  She declines further aggressive treatment.  I have asked that she monitor her pressures at home and if this is running more elevated that she needs to contact us.  She has not been taking the hydralazine as needed.  We will proceed with further cardiac scanning, also considering an echo and carotid Dopplers.  2.  Hyperlipidemia: Again, update comprehensive labs.  3.  Osteoporosis: Due for DEXA.  Update vitamin D level as well.  Would recommend medication therapy if progressive.  4.  Lumbar spinal stenosis: Manageable currently, continues to use gabapentin if needed.  Has prescription for Mobic as well.  5.  Abnormal mammogram: Continues to follow closely with breast specialist.    We should see her back in 6 months.  She is to contact us with any questions and again with updated blood pressure readings.  Problem List Items Addressed This Visit    None  Visit Diagnoses       Routine general medical examination at a health care facility    -  Primary    Relevant Orders    ECG 12 lead (Clinic Performed)    Routine general medical examination at [facility]  facility

## 2024-01-25 NOTE — PATIENT INSTRUCTIONS
We will follow up on all comprehensive blood work once results are available and make any recommendations based on these results as may be indicated.  Please continue with routine gynecologic exams and annual mammograms.  Colonoscopy will next be due in 2027.  We could consider a pneumonia vaccine booster next year.  Please keep close follow-up with your breast specialist.  I do ask that you monitor your blood pressures at home and if elevated, please contact us so that we can escalate therapy accordingly.  I would like to proceed with some additional cardiac testing as well as a bone density scan, please schedule at your convenience.  If you have any questions, please feel free to contact us.  Otherwise, we are happy to see you back for follow-up in 6 months.

## 2024-01-26 RX ORDER — LOSARTAN POTASSIUM AND HYDROCHLOROTHIAZIDE 25; 100 MG/1; MG/1
1 TABLET ORAL DAILY
Qty: 90 TABLET | Refills: 1 | Status: SHIPPED | OUTPATIENT
Start: 2024-01-26 | End: 2024-07-24

## 2024-02-13 ENCOUNTER — APPOINTMENT (OUTPATIENT)
Dept: CARDIOLOGY | Facility: CLINIC | Age: 74
End: 2024-02-13
Payer: MEDICARE

## 2024-02-16 ENCOUNTER — HOSPITAL ENCOUNTER (OUTPATIENT)
Dept: RADIOLOGY | Facility: HOSPITAL | Age: 74
Discharge: HOME | End: 2024-02-16
Payer: MEDICARE

## 2024-02-16 DIAGNOSIS — I10 BENIGN ESSENTIAL HYPERTENSION: ICD-10-CM

## 2024-02-16 DIAGNOSIS — E78.5 BORDERLINE HYPERLIPIDEMIA: ICD-10-CM

## 2024-02-16 DIAGNOSIS — R01.1 CARDIAC MURMUR: ICD-10-CM

## 2024-02-16 PROCEDURE — 75571 CT HRT W/O DYE W/CA TEST: CPT

## 2024-02-21 ENCOUNTER — HOSPITAL ENCOUNTER (OUTPATIENT)
Dept: CARDIOLOGY | Facility: CLINIC | Age: 74
Discharge: HOME | End: 2024-02-21
Payer: MEDICARE

## 2024-02-21 ENCOUNTER — HOSPITAL ENCOUNTER (OUTPATIENT)
Dept: VASCULAR MEDICINE | Facility: CLINIC | Age: 74
Discharge: HOME | End: 2024-02-21
Payer: MEDICARE

## 2024-02-21 DIAGNOSIS — R09.89 BILATERAL CAROTID BRUITS: ICD-10-CM

## 2024-02-21 DIAGNOSIS — R01.0 BENIGN AND INNOCENT CARDIAC MURMURS: ICD-10-CM

## 2024-02-21 DIAGNOSIS — R01.1 CARDIAC MURMUR: ICD-10-CM

## 2024-02-21 LAB
AORTIC VALVE MEAN GRADIENT: 10.3 MMHG
AORTIC VALVE PEAK VELOCITY: 2.08 M/S
AV PEAK GRADIENT: 17.4 MMHG
AVA (PEAK VEL): 2.24 CM2
AVA (VTI): 2.31 CM2
EJECTION FRACTION APICAL 4 CHAMBER: 52.3
EJECTION FRACTION: 58 %
LEFT VENTRICLE INTERNAL DIMENSION DIASTOLE: 3.93 CM (ref 3.5–6)
LEFT VENTRICULAR OUTFLOW TRACT DIAMETER: 2.1 CM
MITRAL VALVE E/A RATIO: 0.6
RIGHT VENTRICLE FREE WALL PEAK S': 12 CM/S
TRICUSPID ANNULAR PLANE SYSTOLIC EXCURSION: 2.3 CM

## 2024-02-21 PROCEDURE — 93306 TTE W/DOPPLER COMPLETE: CPT

## 2024-02-21 PROCEDURE — 93880 EXTRACRANIAL BILAT STUDY: CPT

## 2024-02-21 PROCEDURE — 93306 TTE W/DOPPLER COMPLETE: CPT | Performed by: INTERNAL MEDICINE

## 2024-02-21 PROCEDURE — 93880 EXTRACRANIAL BILAT STUDY: CPT | Performed by: SURGERY

## 2024-04-15 ENCOUNTER — HOSPITAL ENCOUNTER (OUTPATIENT)
Dept: RADIOLOGY | Facility: CLINIC | Age: 74
Discharge: HOME | End: 2024-04-15
Payer: MEDICARE

## 2024-04-15 DIAGNOSIS — Z78.0 POSTMENOPAUSAL ESTROGEN DEFICIENCY: ICD-10-CM

## 2024-04-15 PROCEDURE — 77080 DXA BONE DENSITY AXIAL: CPT | Performed by: RADIOLOGY

## 2024-04-15 PROCEDURE — 77080 DXA BONE DENSITY AXIAL: CPT

## 2024-04-30 ENCOUNTER — APPOINTMENT (OUTPATIENT)
Dept: ORTHOPEDIC SURGERY | Facility: CLINIC | Age: 74
End: 2024-04-30
Payer: MEDICARE

## 2024-07-01 ENCOUNTER — TELEPHONE (OUTPATIENT)
Dept: SURGERY | Facility: HOSPITAL | Age: 74
End: 2024-07-01
Payer: MEDICARE

## 2024-07-01 ENCOUNTER — HOSPITAL ENCOUNTER (OUTPATIENT)
Dept: RADIOLOGY | Facility: CLINIC | Age: 74
Discharge: HOME | End: 2024-07-01
Payer: MEDICARE

## 2024-07-01 DIAGNOSIS — R92.8 ABNORMAL FINDING ON BREAST IMAGING: ICD-10-CM

## 2024-07-01 DIAGNOSIS — R92.8 ABNORMAL FINDING ON BREAST IMAGING: Primary | ICD-10-CM

## 2024-07-01 PROCEDURE — 77065 DX MAMMO INCL CAD UNI: CPT | Mod: RIGHT SIDE | Performed by: STUDENT IN AN ORGANIZED HEALTH CARE EDUCATION/TRAINING PROGRAM

## 2024-07-01 PROCEDURE — G0279 TOMOSYNTHESIS, MAMMO: HCPCS | Mod: RIGHT SIDE | Performed by: STUDENT IN AN ORGANIZED HEALTH CARE EDUCATION/TRAINING PROGRAM

## 2024-07-01 PROCEDURE — 77061 BREAST TOMOSYNTHESIS UNI: CPT | Mod: RT

## 2024-07-01 NOTE — TELEPHONE ENCOUNTER
Result Communication    Resulted Orders   BI mammo right diagnostic tomosynthesis    Narrative    Interpreted By:  iLsa Dixon,   STUDY:  BI MAMMO RIGHT DIAGNOSTIC TOMOSYNTHESIS;  7/1/2024 8:58 am      ACCESSION NUMBER(S):  [id number]      ORDERING CLINICIAN:  KAMILAH SOSA      INDICATION:  Short-term follow-up following benign right breast biopsy.      COMPARISON:  11/27/2023, 11/15/2023.      FINDINGS:  MAMMOGRAPHY: 2D and tomosynthesis images were reviewed at 1 mm slice  thickness.      Density:  There are areas of scattered fibroglandular tissue.      A tissue marker is seen in the central lateral breast. The  mammographic appearance is stable. The previously questioned  correlate for the distortion on the craniocaudal image appears less  pronounced. There are stable benign circumscribed masses. No  suspicious masses or calcifications are identified.        Impression    No mammographic evidence of malignancy. Annual bilateral screening  mammogram recommended 11/2024.      BI-RADS CATEGORY:  BI-RADS Category:  2 Benign.  Recommendation:  Annual Screening.  Recommended Date:  4 Months.  Laterality:  Bilateral.      For any future breast imaging appointments, please call [phone]).          MACRO:  None      Signed by: Lisa Dixon 7/1/2024 10:15 AM  Dictation workstation:   [id number]       11:44 AM

## 2024-07-23 DIAGNOSIS — I10 PRIMARY HYPERTENSION: Primary | ICD-10-CM

## 2024-07-24 RX ORDER — LOSARTAN POTASSIUM AND HYDROCHLOROTHIAZIDE 25; 100 MG/1; MG/1
1 TABLET ORAL DAILY
Qty: 90 TABLET | Refills: 0 | Status: SHIPPED | OUTPATIENT
Start: 2024-07-24 | End: 2025-01-20

## 2024-09-09 ENCOUNTER — LAB (OUTPATIENT)
Dept: LAB | Facility: LAB | Age: 74
End: 2024-09-09
Payer: MEDICARE

## 2024-09-09 ENCOUNTER — APPOINTMENT (OUTPATIENT)
Dept: PRIMARY CARE | Facility: CLINIC | Age: 74
End: 2024-09-09
Payer: MEDICARE

## 2024-09-09 VITALS
WEIGHT: 161 LBS | DIASTOLIC BLOOD PRESSURE: 75 MMHG | BODY MASS INDEX: 29.45 KG/M2 | SYSTOLIC BLOOD PRESSURE: 161 MMHG | HEART RATE: 94 BPM

## 2024-09-09 DIAGNOSIS — M81.0 POST-MENOPAUSAL OSTEOPOROSIS: ICD-10-CM

## 2024-09-09 DIAGNOSIS — E78.5 BORDERLINE HYPERLIPIDEMIA: ICD-10-CM

## 2024-09-09 DIAGNOSIS — I10 PRIMARY HYPERTENSION: ICD-10-CM

## 2024-09-09 DIAGNOSIS — I10 BENIGN ESSENTIAL HYPERTENSION: ICD-10-CM

## 2024-09-09 DIAGNOSIS — I10 BENIGN ESSENTIAL HYPERTENSION: Primary | ICD-10-CM

## 2024-09-09 DIAGNOSIS — M48.062 SPINAL STENOSIS OF LUMBAR REGION WITH NEUROGENIC CLAUDICATION: ICD-10-CM

## 2024-09-09 DIAGNOSIS — K21.9 GASTROESOPHAGEAL REFLUX DISEASE, UNSPECIFIED WHETHER ESOPHAGITIS PRESENT: ICD-10-CM

## 2024-09-09 PROBLEM — S92.919A CLOSED FRACTURE OF PHALANX OF TOE: Status: RESOLVED | Noted: 2024-09-09 | Resolved: 2024-09-09

## 2024-09-09 LAB
25(OH)D3 SERPL-MCNC: 92 NG/ML (ref 30–100)
ANION GAP SERPL CALC-SCNC: 10 MMOL/L (ref 10–20)
BUN SERPL-MCNC: 18 MG/DL (ref 6–23)
CALCIUM SERPL-MCNC: 10.6 MG/DL (ref 8.6–10.6)
CHLORIDE SERPL-SCNC: 99 MMOL/L (ref 98–107)
CO2 SERPL-SCNC: 33 MMOL/L (ref 21–32)
CREAT SERPL-MCNC: 1.1 MG/DL (ref 0.5–1.05)
EGFRCR SERPLBLD CKD-EPI 2021: 53 ML/MIN/1.73M*2
GLUCOSE SERPL-MCNC: 102 MG/DL (ref 74–99)
POTASSIUM SERPL-SCNC: 4.3 MMOL/L (ref 3.5–5.3)
SODIUM SERPL-SCNC: 138 MMOL/L (ref 136–145)

## 2024-09-09 PROCEDURE — 80048 BASIC METABOLIC PNL TOTAL CA: CPT

## 2024-09-09 PROCEDURE — 3077F SYST BP >= 140 MM HG: CPT | Performed by: INTERNAL MEDICINE

## 2024-09-09 PROCEDURE — 1036F TOBACCO NON-USER: CPT | Performed by: INTERNAL MEDICINE

## 2024-09-09 PROCEDURE — 3078F DIAST BP <80 MM HG: CPT | Performed by: INTERNAL MEDICINE

## 2024-09-09 PROCEDURE — 82306 VITAMIN D 25 HYDROXY: CPT

## 2024-09-09 PROCEDURE — 36415 COLL VENOUS BLD VENIPUNCTURE: CPT

## 2024-09-09 PROCEDURE — 1160F RVW MEDS BY RX/DR IN RCRD: CPT | Performed by: INTERNAL MEDICINE

## 2024-09-09 PROCEDURE — 1159F MED LIST DOCD IN RCRD: CPT | Performed by: INTERNAL MEDICINE

## 2024-09-09 PROCEDURE — 99214 OFFICE O/P EST MOD 30 MIN: CPT | Performed by: INTERNAL MEDICINE

## 2024-09-09 RX ORDER — HYDRALAZINE HYDROCHLORIDE 10 MG/1
10 TABLET, FILM COATED ORAL 2 TIMES DAILY
Qty: 180 TABLET | Refills: 1 | Status: SHIPPED | OUTPATIENT
Start: 2024-09-09

## 2024-09-09 RX ORDER — OMEPRAZOLE 20 MG/1
20 CAPSULE, DELAYED RELEASE ORAL
Qty: 90 CAPSULE | Refills: 1 | Status: SHIPPED | OUTPATIENT
Start: 2024-09-09

## 2024-09-09 RX ORDER — LOSARTAN POTASSIUM AND HYDROCHLOROTHIAZIDE 25; 100 MG/1; MG/1
1 TABLET ORAL DAILY
Qty: 90 TABLET | Refills: 1 | Status: SHIPPED | OUTPATIENT
Start: 2024-09-09 | End: 2025-03-08

## 2024-09-09 ASSESSMENT — ENCOUNTER SYMPTOMS
CONSTIPATION: 0
BACK PAIN: 1
ARTHRALGIAS: 1
ABDOMINAL PAIN: 0
WHEEZING: 0
CHEST TIGHTNESS: 0
HEADACHES: 0
NAUSEA: 0
MYALGIAS: 1
DIARRHEA: 0
DIZZINESS: 0
LIGHT-HEADEDNESS: 0
PALPITATIONS: 0
CHILLS: 0
ACTIVITY CHANGE: 0
ABDOMINAL DISTENTION: 0
WEAKNESS: 0
FATIGUE: 0
VOMITING: 0
SHORTNESS OF BREATH: 0
COUGH: 0

## 2024-09-09 NOTE — PROGRESS NOTES
Merna Edgar comes in today for a comprehensive follow up.      Ms. Edgar comes in today for comprehensive follow-up.  Blood pressure remains quite elevated when she presents to the office, but unfortunately this is also running quite elevated at home now as well.  Readings have been in the 150s to 170s systolically, diastolics remain normal.  She has not been taking the hydralazine that was prescribed in the past, has never tried this.  She is concerned about how she will react to this as she is very sensitive to medications.  She denies any headaches, dizziness, chest pain or palpitations, shortness of breath nor cough, nausea, vomiting, nor changes in bowel or bladder habits.  She is not interested in medications for bone density even though this does show a significant osteopenia.  She takes PPI therapy as needed, does need a refill on this.  She has been having some increasing right leg pain with with her chronic low back pain.  She has had an MRI in the past and has seen orthopedics.  Physical therapy has not been of great benefit and she is wondering what her next strategy will be.  She is interested in a repeat MRI and possible referral to physical medicine rehab after discussion of different options.  She otherwise is doing reasonably well.  Wellness visit is up-to-date from the spring.        Review of Systems   Constitutional:  Negative for activity change, chills and fatigue.   Respiratory:  Negative for cough, chest tightness, shortness of breath and wheezing.    Cardiovascular:  Negative for chest pain, palpitations and leg swelling.   Gastrointestinal:  Negative for abdominal distention, abdominal pain, constipation, diarrhea, nausea and vomiting.   Musculoskeletal:  Positive for arthralgias, back pain and myalgias.   Neurological:  Negative for dizziness, syncope, weakness, light-headedness and headaches.       Objective   Physical Exam  Constitutional:       Appearance: Normal appearance.    Cardiovascular:      Rate and Rhythm: Normal rate and regular rhythm.      Heart sounds: Murmur (3/6 SM heard throughout precordium) heard.      No gallop.   Pulmonary:      Effort: No respiratory distress.      Breath sounds: No wheezing, rhonchi or rales.   Abdominal:      General: There is no distension.      Tenderness: There is no abdominal tenderness. There is no guarding.   Musculoskeletal:      Right lower leg: No edema.      Left lower leg: No edema.   Neurological:      Mental Status: She is alert.         Assessment/Plan   1.  Hypertension: Remains poorly managed.  Would strongly encourage addition of medications.  She would like very low dose, we will try hydralazine 10 mg twice daily.  Let us know what blood pressures are doing in response and we will follow-up accordingly.  Due for BMP.  She will need refills on her losartan once labs reviewed as well.  2.  Osteoporosis: Have discussed medication treatment.  She declines at this time.  We will repeat DEXA in 2 years.  She is interested in updating vitamin D levels, order placed with current labs.  3.  Lumbar stenosis: Have discussed at length.  Reasonable to continue with stretching exercises at home.  She declines physical therapy as she states that this was not of great benefit last time she tried this.  She is interested in repeat MRI and we will consider either orthopedic referral or PMNR depending on results.  4.  GERD without esophagitis: Refills provided on PPI therapy, she takes this as needed, refill provided.    If all remains stable and blood pressure well-managed, we will simply see her back in 6 months in the spring for her wellness visit.  She is to call with any questions prior to that time.    Problem List Items Addressed This Visit    None

## 2024-09-09 NOTE — PATIENT INSTRUCTIONS
We will follow up on all comprehensive blood work once results are available and make any recommendations based on these results as may be indicated.  We will add hydralazine 10 mg tablets twice daily for better blood pressure management.  An MRI order has been placed for you to schedule a convenience and we will determine whether any further referrals would be appropriate.  Please contact us if you have any questions or need additional refills and please keep us updated on how your blood pressure is responding to the additional therapy.  We will plan on seeing you back in the spring for your wellness visit, but again please keep us updated on your blood pressure.  Please reach out with any questions.

## 2024-09-18 ENCOUNTER — HOSPITAL ENCOUNTER (OUTPATIENT)
Dept: RADIOLOGY | Facility: CLINIC | Age: 74
Discharge: HOME | End: 2024-09-18
Payer: MEDICARE

## 2024-09-18 DIAGNOSIS — M48.062 SPINAL STENOSIS OF LUMBAR REGION WITH NEUROGENIC CLAUDICATION: ICD-10-CM

## 2024-09-18 PROCEDURE — 72148 MRI LUMBAR SPINE W/O DYE: CPT

## 2024-09-18 PROCEDURE — 72148 MRI LUMBAR SPINE W/O DYE: CPT | Performed by: RADIOLOGY

## 2024-10-16 DIAGNOSIS — M48.062 SPINAL STENOSIS OF LUMBAR REGION WITH NEUROGENIC CLAUDICATION: Primary | ICD-10-CM

## 2024-10-22 ENCOUNTER — OFFICE VISIT (OUTPATIENT)
Dept: URGENT CARE | Age: 74
End: 2024-10-22
Payer: MEDICARE

## 2024-10-22 VITALS
SYSTOLIC BLOOD PRESSURE: 151 MMHG | OXYGEN SATURATION: 96 % | TEMPERATURE: 97.9 F | HEART RATE: 78 BPM | RESPIRATION RATE: 16 BRPM | DIASTOLIC BLOOD PRESSURE: 79 MMHG

## 2024-10-22 DIAGNOSIS — R10.30 LOWER ABDOMINAL PAIN: ICD-10-CM

## 2024-10-22 DIAGNOSIS — R10.813 RIGHT LOWER QUADRANT ABDOMINAL TENDERNESS WITHOUT REBOUND TENDERNESS: Primary | ICD-10-CM

## 2024-10-22 LAB
POC APPEARANCE, URINE: CLEAR
POC BILIRUBIN, URINE: NEGATIVE
POC BLOOD, URINE: NEGATIVE
POC COLOR, URINE: YELLOW
POC GLUCOSE, URINE: NEGATIVE MG/DL
POC KETONES, URINE: NEGATIVE MG/DL
POC LEUKOCYTES, URINE: NEGATIVE
POC NITRITE,URINE: NEGATIVE
POC PH, URINE: 6.5 PH
POC PROTEIN, URINE: NEGATIVE MG/DL
POC SPECIFIC GRAVITY, URINE: 1.01
POC UROBILINOGEN, URINE: 0.2 EU/DL

## 2024-10-22 PROCEDURE — 3077F SYST BP >= 140 MM HG: CPT | Performed by: FAMILY MEDICINE

## 2024-10-22 PROCEDURE — 87086 URINE CULTURE/COLONY COUNT: CPT

## 2024-10-22 PROCEDURE — 3078F DIAST BP <80 MM HG: CPT | Performed by: FAMILY MEDICINE

## 2024-10-22 PROCEDURE — 81003 URINALYSIS AUTO W/O SCOPE: CPT | Performed by: FAMILY MEDICINE

## 2024-10-22 PROCEDURE — 99213 OFFICE O/P EST LOW 20 MIN: CPT | Performed by: FAMILY MEDICINE

## 2024-10-22 RX ORDER — NITROFURANTOIN 25; 75 MG/1; MG/1
100 CAPSULE ORAL 2 TIMES DAILY
Qty: 10 CAPSULE | Refills: 0 | Status: SHIPPED | OUTPATIENT
Start: 2024-10-22 | End: 2024-10-27

## 2024-10-22 NOTE — PROGRESS NOTES
Subjective   Patient ID: Merna Edgar is a 73 y.o. female. They present today with a chief complaint of Abdominal Pain (Sharp pain lower abd x 2 days).    History of Present Illness  HPI    As noted above.  Patient points to the suprapubic region, sharp, pulling type of pain that was worse when it started, seems to be better now.  The pain is constant, decreased when she is walking and when she is wearing tight clothing.  Patient denies dysuria or increase in frequency.  She denies fever, nausea, vomiting, anorexia, diarrhea.  She gets constipated but feels this is unchanged from before.  She usually manages this with high-fiber diet, no specific medication.  It has been a while since her last bowel movement.  No prior abdominal surgeries.    Patient suspects she has a UTI as she did an over-the-counter test and it showed presence of leukocytes but no nitrites.  She also wonders about the UTI because she has no specific urinary complaints.    Past Medical History  Allergies as of 10/22/2024 - Reviewed 09/18/2024   Allergen Reaction Noted    Penicillins Rash 04/24/2023       (Not in a hospital admission)       Past Medical History:   Diagnosis Date    Abnormal uterine bleeding     Age-related osteoporosis without current pathological fracture 12/19/2017    Post-menopausal osteoporosis    Closed fracture of phalanx of toe 09/09/2024    Epigastric pain 05/11/2023    Essential (primary) hypertension 12/19/2017    Benign essential hypertension    Insomnia, unspecified 12/19/2017    Insomnia    Mastitis     Pain in left hip 12/19/2017    Chronic left hip pain    Phalanx fracture, foot     Thickened endometrium        Past Surgical History:   Procedure Laterality Date    BI STEREOTACTIC GUIDED BREAST RIGHT LOCALIZATION AND BIOPSY Right 12/1/2023    BI STEREOTACTIC GUIDED BREAST RIGHT LOCALIZATION AND BIOPSY 12/1/2023 [facility]    BREAST BIOPSY Left 2022    benign    BREAST CYST ASPIRATION           reports that she has never smoked. She has never used smokeless tobacco. She reports that she does not currently use alcohol. She reports that she does not use drugs.    Review of Systems  Review of Systems                               Objective    Vitals:    10/22/24 0848   BP: 151/79   BP Location: Left arm   Patient Position: Sitting   Pulse: 78   Resp: 16   Temp: 36.6 °C (97.9 °F)   SpO2: 96%     No LMP recorded (lmp unknown). Patient is postmenopausal.    Physical Exam    Constitutional: Vital signs reviewed. Well developed and well nourished. Patient alert and without distress.     Head and Face: Atraumatic.      Eyes: Pupils and irises normal. Pink conjunctivae, anicteric sclerae. No conjunctival injection.    Neck:  Supple.    Cardiovascular: No peripheral edema.    Pulmonary: No respiratory distress.    Abdomen: Normal bowel sounds, nondistended, soft.  Tenderness in the left lower quadrant without rebound or guarding.  Some lumpiness palpated.  Bladder is nondistended.  Negative CVA tenderness.    Skin: No lesions of the trunk.    Neurologic: Cortical function: Normal    Lymphatic: No inguinal lymphadenopathy.       Procedures    Point of Care Test & Imaging Results from this visit  Results for orders placed or performed in visit on 10/22/24   POCT UA Automated manually resulted   Result Value Ref Range    POC Color, Urine Yellow Straw, Yellow, Light-Yellow    POC Appearance, Urine Clear Clear    POC Glucose, Urine NEGATIVE NEGATIVE mg/dl    POC Bilirubin, Urine NEGATIVE NEGATIVE    POC Ketones, Urine NEGATIVE NEGATIVE mg/dl    POC Specific Gravity, Urine 1.010 1.005 - 1.035    POC Blood, Urine NEGATIVE NEGATIVE    POC PH, Urine 6.5 No Reference Range Established PH    POC Protein, Urine NEGATIVE NEGATIVE, 30 (1+) mg/dl    POC Urobilinogen, Urine 0.2 0.2, 1.0 EU/DL    Poc Nitrite, Urine NEGATIVE NEGATIVE    POC Leukocytes, Urine NEGATIVE NEGATIVE      No results found.    Diagnostic study results (if any)  were reviewed by Tressa Thomas.    Assessment/Plan   Allergies, medications, history, and pertinent labs/EKGs/Imaging reviewed by Tressa Thomas.     Medical Decision Making  Patient has a few leukocytes with negative nitrates in the urine, lower abdominal tenderness without peritoneal signs but no dysuria.  We can do a trial of oral Macrodantin and acetaminophen for pain.  [facility] to [facility], we will follow her urine culture results.  If she has an early result of negative and pain is persisting, she will go to the ED for more in-depth testing/management.  We reviewed symptoms to watch and when to go to ED as well.  Patient's questions were answered to her satisfaction.  Patient is encouraged to reach out to call the urgent care if she has further questions or concerns.  Short-term follow-up with PCP.    Orders and Diagnoses  Diagnoses and all orders for this visit:  Right lower quadrant abdominal tenderness without rebound tenderness  -     nitrofurantoin, macrocrystal-monohydrate, (Macrobid) 100 mg capsule; Take 1 capsule (100 mg) by mouth 2 times a day for 5 days.  -     Urine Culture  Lower abdominal pain  -     POCT UA Automated manually resulted      Medical Admin Record      Patient disposition: Home    Electronically signed by Tressa Thomas  2:52 PM

## 2024-10-22 NOTE — PATIENT INSTRUCTIONS
Go to the emergency department for severe symptoms.    Please follow-up with your primary care in 1 week.    It is not clear as to the source of your abdominal pain, keep a close eye for worsening symptoms.  You may start the antibiotic now however in 36 hours please check your test result: If this is negative and you are having persistent pain, especially if worse, please head to the emergency department for further evaluation and management.    Please manage your constipation with your usual high-fiber diet, fluids and exercise  to help move the bowels.  Normal activity as tolerated.    Acetaminophen 500 mg (Extra StrengthTylenol), 2 tablets every 6 hours as needed for fever or pain.    No Ibuprofen (Advil or Motrin) or Naproxen (Aleve); no decongestants or cold/sinus medicines as these can raise your blood pressure.

## 2024-10-23 LAB — BACTERIA UR CULT: NO GROWTH

## 2024-10-25 ENCOUNTER — OFFICE VISIT (OUTPATIENT)
Dept: PRIMARY CARE | Facility: CLINIC | Age: 74
End: 2024-10-25
Payer: MEDICARE

## 2024-10-25 ENCOUNTER — LAB (OUTPATIENT)
Dept: LAB | Facility: LAB | Age: 74
End: 2024-10-25
Payer: MEDICARE

## 2024-10-25 VITALS
DIASTOLIC BLOOD PRESSURE: 69 MMHG | WEIGHT: 160 LBS | BODY MASS INDEX: 28.34 KG/M2 | SYSTOLIC BLOOD PRESSURE: 148 MMHG | HEART RATE: 82 BPM

## 2024-10-25 DIAGNOSIS — R10.32 ACUTE ABDOMINAL PAIN IN LEFT LOWER QUADRANT: Primary | ICD-10-CM

## 2024-10-25 DIAGNOSIS — R10.32 ACUTE ABDOMINAL PAIN IN LEFT LOWER QUADRANT: ICD-10-CM

## 2024-10-25 LAB
ALBUMIN SERPL BCP-MCNC: 4.4 G/DL (ref 3.4–5)
ALP SERPL-CCNC: 78 U/L (ref 33–136)
ALT SERPL W P-5'-P-CCNC: 12 U/L (ref 7–45)
ANION GAP SERPL CALC-SCNC: 12 MMOL/L (ref 10–20)
AST SERPL W P-5'-P-CCNC: 15 U/L (ref 9–39)
BASOPHILS # BLD AUTO: 0.05 X10*3/UL (ref 0–0.1)
BASOPHILS NFR BLD AUTO: 0.8 %
BILIRUB SERPL-MCNC: 0.6 MG/DL (ref 0–1.2)
BUN SERPL-MCNC: 14 MG/DL (ref 6–23)
CALCIUM SERPL-MCNC: 9.8 MG/DL (ref 8.6–10.6)
CHLORIDE SERPL-SCNC: 99 MMOL/L (ref 98–107)
CO2 SERPL-SCNC: 31 MMOL/L (ref 21–32)
CREAT SERPL-MCNC: 1.07 MG/DL (ref 0.5–1.05)
EGFRCR SERPLBLD CKD-EPI 2021: 55 ML/MIN/1.73M*2
EOSINOPHIL # BLD AUTO: 0.07 X10*3/UL (ref 0–0.4)
EOSINOPHIL NFR BLD AUTO: 1.1 %
ERYTHROCYTE [DISTWIDTH] IN BLOOD BY AUTOMATED COUNT: 12.4 % (ref 11.5–14.5)
ERYTHROCYTE [SEDIMENTATION RATE] IN BLOOD BY WESTERGREN METHOD: 25 MM/H (ref 0–30)
GLUCOSE SERPL-MCNC: 102 MG/DL (ref 74–99)
HCT VFR BLD AUTO: 42.1 % (ref 36–46)
HGB BLD-MCNC: 13.5 G/DL (ref 12–16)
IMM GRANULOCYTES # BLD AUTO: 0.01 X10*3/UL (ref 0–0.5)
IMM GRANULOCYTES NFR BLD AUTO: 0.2 % (ref 0–0.9)
LYMPHOCYTES # BLD AUTO: 1.55 X10*3/UL (ref 0.8–3)
LYMPHOCYTES NFR BLD AUTO: 24.4 %
MCH RBC QN AUTO: 27.6 PG (ref 26–34)
MCHC RBC AUTO-ENTMCNC: 32.1 G/DL (ref 32–36)
MCV RBC AUTO: 86 FL (ref 80–100)
MONOCYTES # BLD AUTO: 0.61 X10*3/UL (ref 0.05–0.8)
MONOCYTES NFR BLD AUTO: 9.6 %
NEUTROPHILS # BLD AUTO: 4.06 X10*3/UL (ref 1.6–5.5)
NEUTROPHILS NFR BLD AUTO: 63.9 %
NRBC BLD-RTO: 0 /100 WBCS (ref 0–0)
PLATELET # BLD AUTO: 257 X10*3/UL (ref 150–450)
POTASSIUM SERPL-SCNC: 3.7 MMOL/L (ref 3.5–5.3)
PROT SERPL-MCNC: 6.7 G/DL (ref 6.4–8.2)
RBC # BLD AUTO: 4.89 X10*6/UL (ref 4–5.2)
SODIUM SERPL-SCNC: 138 MMOL/L (ref 136–145)
WBC # BLD AUTO: 6.4 X10*3/UL (ref 4.4–11.3)

## 2024-10-25 PROCEDURE — 1160F RVW MEDS BY RX/DR IN RCRD: CPT | Performed by: INTERNAL MEDICINE

## 2024-10-25 PROCEDURE — 36415 COLL VENOUS BLD VENIPUNCTURE: CPT

## 2024-10-25 PROCEDURE — 85652 RBC SED RATE AUTOMATED: CPT

## 2024-10-25 PROCEDURE — 80053 COMPREHEN METABOLIC PANEL: CPT

## 2024-10-25 PROCEDURE — 1159F MED LIST DOCD IN RCRD: CPT | Performed by: INTERNAL MEDICINE

## 2024-10-25 PROCEDURE — 85025 COMPLETE CBC W/AUTO DIFF WBC: CPT

## 2024-10-25 PROCEDURE — 3077F SYST BP >= 140 MM HG: CPT | Performed by: INTERNAL MEDICINE

## 2024-10-25 PROCEDURE — 99214 OFFICE O/P EST MOD 30 MIN: CPT | Performed by: INTERNAL MEDICINE

## 2024-10-25 PROCEDURE — 3078F DIAST BP <80 MM HG: CPT | Performed by: INTERNAL MEDICINE

## 2024-10-25 ASSESSMENT — ENCOUNTER SYMPTOMS
SHORTNESS OF BREATH: 0
COUGH: 0
WHEEZING: 0
DYSURIA: 0
CHEST TIGHTNESS: 0
CONSTIPATION: 0
ACTIVITY CHANGE: 0
DIAPHORESIS: 0
BLOOD IN STOOL: 0
HEMATURIA: 0
FEVER: 0
RECTAL PAIN: 0
ABDOMINAL PAIN: 1
ABDOMINAL DISTENTION: 0
VOMITING: 0
ANAL BLEEDING: 0
NAUSEA: 0
DIFFICULTY URINATING: 0
CHILLS: 0
PALPITATIONS: 0
DIARRHEA: 0

## 2024-10-25 NOTE — PROGRESS NOTES
"Merna Edgar comes in today for a [facility] follow-up.      Ms. Edgar comes in today for [facility] follow-up.  She was seen earlier this week with acute suprapubic pain.  She has a detailed description of what occurred.  She started having abdominal pain on Saturday evening.  This was described as a \"tremendous pain\" with bloating.  She did go out for some social event earlier, had some appetizers.  The night before she had a salad with some pomegranate seeds.  Sunday she had a decreased appetite, took omeprazole but this was continuing.  Monday the pain localized more in her lower abdomen.  She did eat some scrambled eggs that evening, this did not intensify the pain.  She did not have any nausea, vomiting, fevers, nor significant change in her bowel movements.  She did an over-the-counter urinalysis which showed some leukocytes but otherwise normal.  Tuesday this developed into a more pulling sensation in the suprapubic region, worse with movement.  She went to [facility], urine culture was done which was negative, but based on initial urinalysis she was prescribed Macrobid, took this for 3 days until culture results returned.  She generally is feeling better but still has significant bloating and gassiness, still some pulling sensation in her lower abdomen.  She does not feel constipated.  Diet has been bland.  Again no nausea, vomiting, fevers nor blood in her stool.  She is here for further evaluation.  She does have a history of ovarian cysts in the past, has not been active for some time.  She has not noticed any blood in her urine.        Review of Systems   Constitutional:  Negative for activity change, chills, diaphoresis and fever.   Respiratory:  Negative for cough, chest tightness, shortness of breath and wheezing.    Cardiovascular:  Negative for chest pain, palpitations and leg swelling.   Gastrointestinal:  Positive for abdominal pain. Negative for abdominal distention, anal bleeding, blood in stool, " constipation, diarrhea, nausea, rectal pain and vomiting.   Genitourinary:  Positive for pelvic pain. Negative for decreased urine volume, difficulty urinating, dysuria, hematuria, urgency and vaginal bleeding.       Objective   Physical Exam  Constitutional:       Appearance: Normal appearance.   Cardiovascular:      Rate and Rhythm: Normal rate.   Pulmonary:      Effort: Pulmonary effort is normal. No respiratory distress.   Abdominal:      General: Bowel sounds are normal. There is no distension.      Palpations: Abdomen is soft. There is no mass.      Tenderness: There is abdominal tenderness (Mild lower abdominal tenderness, generalized, most prominent in the suprapubic and left lower quadrants). There is no right CVA tenderness, left CVA tenderness, guarding or rebound.      Hernia: No hernia is present.   Neurological:      Mental Status: She is alert.         Assessment/Plan   1.  Acute abdominal pain: Generally her symptoms are improving but I am worried about a possible diverticulitis.  We will proceed with a CBC, CMP and sed rate.  If any evidence of leukocytosis or elevated sed rate, would opt to treat with antibiotics.  If any worsening, or if not responding, would consider CT scan of the abdomen/pelvis.  She will follow-up with us this weekend and we will try to follow-up on labs as soon as available.  She is to call with any intensification or change in studies.  Problem List Items Addressed This Visit    None

## 2024-10-25 NOTE — PATIENT INSTRUCTIONS
We will start with prompt lab studies.  If any evidence of infection, we will consider antibiotic treatment and if any symptomatically worsening symptoms, we would proceed with a CT scan of the abdomen/pelvis.  Please follow-up with us this weekend and please reach out with any acute change in symptoms or any additional questions.

## 2024-12-03 ENCOUNTER — HOSPITAL ENCOUNTER (OUTPATIENT)
Dept: RADIOLOGY | Facility: CLINIC | Age: 74
Discharge: HOME | End: 2024-12-03
Payer: MEDICARE

## 2024-12-03 VITALS — HEIGHT: 63 IN | WEIGHT: 158 LBS | BODY MASS INDEX: 28 KG/M2

## 2024-12-03 DIAGNOSIS — Z12.31 ENCOUNTER FOR SCREENING MAMMOGRAM FOR MALIGNANT NEOPLASM OF BREAST: ICD-10-CM

## 2024-12-03 DIAGNOSIS — R92.8 ABNORMAL FINDING ON BREAST IMAGING: ICD-10-CM

## 2024-12-03 PROCEDURE — 77067 SCR MAMMO BI INCL CAD: CPT | Performed by: RADIOLOGY

## 2024-12-03 PROCEDURE — 77063 BREAST TOMOSYNTHESIS BI: CPT | Performed by: RADIOLOGY

## 2024-12-03 PROCEDURE — 77067 SCR MAMMO BI INCL CAD: CPT

## 2024-12-10 ENCOUNTER — APPOINTMENT (OUTPATIENT)
Dept: SURGICAL ONCOLOGY | Facility: CLINIC | Age: 74
End: 2024-12-10
Payer: MEDICARE

## 2024-12-26 NOTE — PROGRESS NOTES
Merna Edgar female   1950 74 y.o.   [id number]      Chief Complaint    Follow-up          [facility]  Merna Edgar is a 74 y.o.  . [place] woman [facility]. She has chronic intermittent lateral inferior breast pain for >20 years. 2023 right breast stereotactic core biopsy for distortion with cholesterol cleft with organizing fat necrosis, focal PASH and columnar cell changes. 10/2022 left stereotactic core biopsy with PASH and usual ductal hyperplasia.  She has history of breast surgery for mastitis in .      BREAST IMAGIN/15/2023 bilateral screening mammogram, BI-RADS Category 0, right focal asymmetry. 2023 right diagnostic mammogram and ultrasound, BI-RADS Category 4, right mass without ultrasound correlate, stereotactic biopsy performed with benign results. 2024 right diagnostic mammogram, BI-RADS Category 2. 12/3/2024 bilateral screening mammogram, BI-RADS Category 2.      FEMALE HISTORY: menarche age 11, , first birth age 25, OCPs 5-10 years, menopause age 52, heterogeneously dense breast tissue, previous biopsies     FAMILY CANCER HISTORY: None         REVIEW OF SYSTEMS    Constitutional:  Negative for appetite change, fatigue, fever and unexpected weight change.   HENT:  Negative for ear pain, hearing loss, nosebleeds, sore throat and trouble swallowing.    Eyes:  Negative for discharge, itching and visual disturbance.   Respiratory:  Negative for cough, chest tightness and shortness of breath.    Cardiovascular:  Negative for chest pain, palpitations and leg swelling.   Breast: as indicated in HPI  Gastrointestinal:  Negative for abdominal pain, constipation, diarrhea and nausea.   Endocrine: Negative for cold intolerance and heat intolerance.   Genitourinary:  Negative for dysuria, frequency, hematuria, pelvic pain and vaginal bleeding.   Musculoskeletal:  Negative for arthralgias, back pain, gait problem,  joint swelling and myalgias.   Skin:  Negative for color change and rash.   Allergic/Immunologic: Negative for environmental allergies and food allergies.   Neurological:  Negative for dizziness, tremors, speech difficulty, weakness, numbness and headaches.   Hematological:  Does not bruise/bleed easily.   Psychiatric/Behavioral:  Negative for agitation, dysphoric mood and sleep disturbance. The patient is not nervous/anxious.         MEDICATIONS  Current Outpatient Medications   Medication Instructions    hydrALAZINE (APRESOLINE) 10 mg, oral, 2 times daily    losartan-hydrochlorothiazide (Hyzaar) 100-25 mg tablet 1 tablet, oral, Daily    omeprazole (PRILOSEC) 20 mg, oral, Daily before breakfast        ALLERGIES  Allergies   Allergen Reactions    Penicillins Rash        Patient Active Problem List    Diagnosis Date Noted    Bilateral carotid bruits 01/25/2024    Abnormal finding on breast imaging 11/27/2023    Age-related nuclear cataract of left eye 05/11/2023    Age-related nuclear cataract of right eye 05/11/2023    Chronic left hip pain 05/11/2023    Hyperopia with astigmatism and presbyopia 05/11/2023    Insomnia 05/11/2023    Lumbar stenosis 05/11/2023    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    Benign essential hypertension 04/24/2023    Cardiac murmur 04/24/2023    Borderline hyperlipidemia 04/24/2023    Chronic renal impairment, stage 3a (Multi) 04/24/2023    Hot flashes, menopausal 04/24/2023    Situational depression 04/24/2023    Post-menopausal osteoporosis 04/24/2023     Past Medical History:   Diagnosis Date    Abnormal uterine bleeding     Age-related osteoporosis without current pathological fracture 12/19/2017    Post-menopausal osteoporosis    Closed fracture of phalanx of toe 09/09/2024    Epigastric pain 05/11/2023    Essential (primary) hypertension 12/19/2017    Benign essential hypertension    Insomnia, unspecified 12/19/2017    Insomnia    Mastitis     Pain in left hip 12/19/2017    Chronic left hip  pain    Phalanx fracture, foot     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    BREAST BIOPSY Left 2022    benign    BREAST CYST ASPIRATION        Family History   Problem Relation Name Age of Onset    Asthma Mother            SOCIAL HISTORY      Social History     Tobacco Use    Smoking status: Never    Smokeless tobacco: Never   Substance Use Topics    Alcohol use: Not Currently        VITALS  Vitals:    12/27/24 0827   BP: 144/82   Pulse: 78   Temp: 36.3 °C (97.4 °F)        PHYSICAL EXAM  Patient is alert and oriented x3, with appropriate mood. The gait is steady and hand grasps are equal. Sclera clear. The breasts are nearly symmetrical. The tissue is soft without palpable abnormalities, discrete nodules or masses. The skin and nipples appear normal. There is no cervical, supraclavicular, or axillary lymphadenopathy palpable. Heart rate and rhythm normal, S1 and S2 appreciated. The lungs are clear bilaterally. Abdomen is soft & non-tender.     Physical Exam  Chest:              IMAGING    Time was spent viewing digital images of the radiology testing with the patient. I explained the results in depth, along with suggested explanation for follow up recommendations based on the testing results.          ORDERS  Orders Placed This Encounter   Procedures    BI mammo bilateral screening tomosynthesis     Standing Status:   Future     Standing Expiration Date:   2/26/2026     Order Specific Question:   Perform a breast ultrasound if clinically indicated by Radiologist?     Answer:   Yes     Order Specific Question:   Previous Mamm performed at  location?     Answer:   Yes     Order Specific Question:   Reason for exam:     Answer:   hx benign  bx     Order Specific Question:   Radiologist to Determine Optimal Study     Answer:   Yes     Order Specific Question:   Release result to [employer]     Answer:   Immediate     Order Specific Question:   Is this exam part of a Research Study? If Yes, link this order to the research study     Answer:   No          ASSESSMENT/PLAN  1. Encounter for screening mammogram for malignant neoplasm of breast  BI mammo bilateral screening tomosynthesis      2. Chronic breast pain               Follow up for your breast health by return to PCP. She may want to continue care with [facility].   Thank you for coming to see me today at [facility]. Your clinical examination and imaging is normal. You no longer need to be seen by a surgical breast specialist. It is important to continue annual screening mammograms & clinical breast exams. Please return to see me if you have a new breast problem or abnormal mammogram. It has been a pleasure having you as a patient.     You can see your health information, review clinical summaries from office visits & test results online when you follow your health with MY  Chart, a personal health record. To sign up go to [email or web address]. If you need assistance with signing up or trouble getting into your account call [employer] Patient Line 24/7 at [phone].     My office phone number is [phone] if you need to get in touch with me or have additional questions or problems. Thank you for choosing [facility] and trusting me as your healthcare provider. I am honored to be a provider on your health care team and I remain dedicated to helping you achieve your health goals.       Regi Drew, APRN-CNP  [facility] [place]

## 2024-12-27 ENCOUNTER — OFFICE VISIT (OUTPATIENT)
Dept: SURGICAL ONCOLOGY | Facility: CLINIC | Age: 74
End: 2024-12-27
Payer: MEDICARE

## 2024-12-27 VITALS
DIASTOLIC BLOOD PRESSURE: 82 MMHG | HEART RATE: 78 BPM | BODY MASS INDEX: 28.52 KG/M2 | SYSTOLIC BLOOD PRESSURE: 144 MMHG | WEIGHT: 161 LBS | TEMPERATURE: 97.4 F

## 2024-12-27 DIAGNOSIS — N64.4 CHRONIC BREAST PAIN: ICD-10-CM

## 2024-12-27 DIAGNOSIS — G89.29 CHRONIC BREAST PAIN: ICD-10-CM

## 2024-12-27 DIAGNOSIS — Z12.31 ENCOUNTER FOR SCREENING MAMMOGRAM FOR MALIGNANT NEOPLASM OF BREAST: Primary | ICD-10-CM

## 2024-12-27 PROCEDURE — 3077F SYST BP >= 140 MM HG: CPT | Performed by: NURSE PRACTITIONER

## 2024-12-27 PROCEDURE — 3079F DIAST BP 80-89 MM HG: CPT | Performed by: NURSE PRACTITIONER

## 2024-12-27 PROCEDURE — 99213 OFFICE O/P EST LOW 20 MIN: CPT | Performed by: NURSE PRACTITIONER

## 2024-12-27 PROCEDURE — 1126F AMNT PAIN NOTED NONE PRSNT: CPT | Performed by: NURSE PRACTITIONER

## 2024-12-27 PROCEDURE — 1159F MED LIST DOCD IN RCRD: CPT | Performed by: NURSE PRACTITIONER

## 2024-12-27 PROCEDURE — 1160F RVW MEDS BY RX/DR IN RCRD: CPT | Performed by: NURSE PRACTITIONER

## 2024-12-27 ASSESSMENT — PAIN SCALES - GENERAL: PAINLEVEL_OUTOF10: 0-NO PAIN

## 2025-01-06 ENCOUNTER — APPOINTMENT (OUTPATIENT)
Dept: PHYSICAL MEDICINE AND REHAB | Facility: CLINIC | Age: 75
End: 2025-01-06
Payer: MEDICARE

## 2025-01-06 VITALS
OXYGEN SATURATION: 95 % | HEIGHT: 63 IN | SYSTOLIC BLOOD PRESSURE: 171 MMHG | BODY MASS INDEX: 28.7 KG/M2 | DIASTOLIC BLOOD PRESSURE: 78 MMHG | WEIGHT: 162 LBS | HEART RATE: 82 BPM | TEMPERATURE: 97.7 F

## 2025-01-06 DIAGNOSIS — M47.816 LUMBAR SPONDYLOSIS: ICD-10-CM

## 2025-01-06 DIAGNOSIS — M76.31 ILIOTIBIAL BAND SYNDROME OF RIGHT SIDE: ICD-10-CM

## 2025-01-06 DIAGNOSIS — M79.18 MYOFASCIAL PAIN: ICD-10-CM

## 2025-01-06 DIAGNOSIS — M70.61 TROCHANTERIC BURSITIS OF RIGHT HIP: Primary | ICD-10-CM

## 2025-01-06 PROCEDURE — 3078F DIAST BP <80 MM HG: CPT | Performed by: PHYSICAL MEDICINE & REHABILITATION

## 2025-01-06 PROCEDURE — 99204 OFFICE O/P NEW MOD 45 MIN: CPT | Performed by: PHYSICAL MEDICINE & REHABILITATION

## 2025-01-06 PROCEDURE — 1125F AMNT PAIN NOTED PAIN PRSNT: CPT | Performed by: PHYSICAL MEDICINE & REHABILITATION

## 2025-01-06 PROCEDURE — 1159F MED LIST DOCD IN RCRD: CPT | Performed by: PHYSICAL MEDICINE & REHABILITATION

## 2025-01-06 PROCEDURE — G2211 COMPLEX E/M VISIT ADD ON: HCPCS | Performed by: PHYSICAL MEDICINE & REHABILITATION

## 2025-01-06 PROCEDURE — 3008F BODY MASS INDEX DOCD: CPT | Performed by: PHYSICAL MEDICINE & REHABILITATION

## 2025-01-06 PROCEDURE — 3077F SYST BP >= 140 MM HG: CPT | Performed by: PHYSICAL MEDICINE & REHABILITATION

## 2025-01-06 ASSESSMENT — PAIN SCALES - GENERAL: PAINLEVEL_OUTOF10: 5

## 2025-01-06 NOTE — LETTER
"January 6, 2025     Luz Maria Oakes MD  [address]  [facility], [address] [address]    Patient: Merna Edgar   YOB: 1950   Date of Visit: 1/6/2025       Dear Dr. Luz Maria Oakes MD:    Thank you for referring Merna Edgar to me for evaluation. Below are my notes for this consultation.  If you have questions, please do not hesitate to call me. I look forward to following your patient along with you.       Sincerely,     Echo Sweet MD      CC: No Recipients  ______________________________________________________________________________________    Chief complaint: Right hip and leg pain    Dear Dr. Oakes    I had the pleasure of seeing your patient, Merna Edgar, in clinic today. As you know, She is a pleasant 74 y.o. right handed female, with significant past medical history of osteoporosis, DDD, GERD, hypertension, CKD who presents for chronic right hip and leg pain.    TIMELINE OF COMPLAINT(S):    She has been having bilateral low back pain on and off for the last 15 to 20 years.  She will get occasional back spasms, the right has always been worse than the left.  There was no inciting event or trauma at the initiation of her pain.  She has been \"dealing with the pain\" for these several years and learning to live with the pain.  Her back pain is manageable, however her right hip and leg pain is of most concern to her today and she wants to know the cause of the pain.    She has had right hip pain for the past 5 years.  There was no inciting event or trauma that she can recall.  She is very active.  She does home exercises and is active 6 times a week, but works on her own exercises not specific to the hip.  She has noticed some improvement in hip pain with exercise, but notices that the back pain has improved significantly.  She has also been going to physical therapy on and off for several years, currently going once per week.  She has been told by PT that her hips " are misaligned. Stretching and traction helps with her hip pain.      She has previously seen Dr. Ortiz and 2019 for her ongoing right hip and lower back pain.  At this time she was offered physical therapy, medications and injections.  She was offered NSAIDs, gabapentin, Tylenol, meloxicam, gabapentin however has not taken any medication due to patient preferences.  She has also declined any injections.  She has had hip x-rays and a recent lumbar MRI performed.     Note from primary care physician Dr. Oakes from 9/9/2024 personally reviewed today.  Patient reported increasing right leg pain with chronic low back pain.  Repeat MRI and referral to PMR.  Lumbar MRI completed 9/18.    Note from primary care physician Dr. Oakes from 1/25/2024 personally reviewed today.  For management of lumbar spinal stenosis, recommended gabapentin as needed and Mobic.    Pain:  LOCATION- Right low back, hip and leg pain  RADIATION- Radiation to lateral thigh into knee, can sometimes radiate to anterior thigh  CONSTANT or INTERMITTENT- Intermittent   SEVERITY/QUANTITY- 5  QUALITY- aching and burning  WEAKNESS- No  NUMBNESS/TINGLING- No  ASSOCIATED WITH-inability to walk long distances  EXACERBATED BY- Night time, walking long distances  BETTER WITH- Change position, stretching and deep massage with PT has helped; CBD cream  TRIED- CBD cream, heating pads      Anti-Inflammatories: (Patient has CKD) ibuprofen (has not tried), previously prescribed meloxicam (has not tried)      Muscle relaxants:      Anti-depressants:       Neuroleptics: Previously prescribed gabapentin (has not tried)      LDN:    PHYSICAL THERAPY: Yes, currently going once per week, gym 6 days per week, raquel twice a week, 1 day aqua aerobics, 1 week abdominal yoga, 1 day per week core/aerobics/weights.  CHIROPRACTIC MANIPULATION: No  TENS unit: No  ACUPUNCTURE TREATMENTS: No  DEEP TISSUE MASSAGE THERAPY: No  OSTEOPATHIC MANIPULATION THERAPY: No    EMG/NCS:  No    INJECTIONS: No      IMAGING: Yes    === 09/18/24 ===  MR LUMBAR SPINE WO CONTRAST  - Impression -  Multilevel degenerative disc disease and facet arthrosis without  significant spinal canal stenosis. Varying degrees of neural  foraminal narrowing most pronounced at L5-S1 with moderate to severe  bilateral neural foraminal stenosis.    === 12/19/2019 ===  XR hip Right 2-3 view  Bones: The bones appear intact.  Joints: The joints are maintained.  Soft tissues: Unremarkable.  IMPRESSION:  Normal exam.    === 12/19/2019 ====  MR Right hip  IMPRESSION:  1. Tearing of the anterior and anterior/superior labrum.  2. Bilateral trochanteric bursitis.       Lab Results   Component Value Date    HGBA1C 5.4 09/22/2022       FUNCTIONAL HISTORY: The patient is independent in all ADLs, mobility, and driving. The patient does not use any assistive device.    SH:  Lives in: [place]  Lives with: Spouse  Occupation: Retired  Tobacco: No  Alcohol: No  Drugs: No  __________________________________    ROS: The patient denies any bowel or bladder incontinence/accidents, night sweats, fevers, chills, recent significant weight loss. A 14 point review of systems was reviewed with the patient and is as above and otherwise negative.  ROS questionnaire positive for joint pain, back pain, sleep disturbances    Physical Exam  Constitutional:           Comments: Right hip and leg pain.          PHYSICAL EXAM    GEN - Alert, well-developed, well-nourished, no acute distress  PSYCH - Cooperative, appropriate mood and affect  HEENT - NC/AT  RESP - Non-labored respirations, equal expansion  CV - warm and well-perfused, No cyanosis or edema in extremities. DP pulses 2+ bilaterally  ABD- soft, ND  SKIN - No rash.    BACK/SPINE - Mild thoracolumbar scoliosis. no erythema, edema, or swelling.  Mild right sided lumbar pain with sidebending. No pain with extension, flexion, rotation. No pain with facet loading. No tenderness of lumbosacral spinous  processes. Mild tenderness over the right lumbar paraspinal muscles. No tenderness over the iliolumbar ligaments bilaterally.  Moderate and reproducible tenderness over the right greater trochanter, glut medius and proximal IT band.  No TTP of bilateral PSIS, sacral sulcus, piriformis.  Sacral compression negative bilaterally. Straight leg raise negative bilaterally.     HIPS/PELVIS - Symmetric in standing and lying. Passive hip flexion, internal rotation, and external rotation within functional normal limits bilaterally without provocation of pain symptoms except with external rotation which reproduces lateral hip pain. No tenderness over iliopsoas tendon. FABERs on right reproduced pain near greater trochanter. Negative hip clicking. Negative hip impingement test. Negative log roll. Negative resisted active SLR. No pain with deep hip flexion. Fred positive on right.    NEURO -   LE strength 5/5 -  including hip flexors, knee flexors, knee extensors, ankle dorsiflexors, ankle plantar flexors, and EHL   Sensation - intact to light touch in bilateral lower extremities.   Reflexes - Brisk 3+ biceps, brachioradialis, triceps, 2+ patellar and Achilles reflexes bilaterally No Clonus, No Babinski, Franks's negative bilaterally  GAIT - Normal base, normal stride length, non-antalgic. Able to toe walk and heel walk without difficulty. Able to perform tandem gait without difficulty.    IMPRESSION:    This is a pleasant 74 y.o. right handed female, with significant past medical history of osteoporosis, DDD, GERD, hypertension, CKD who presents for chronic right hip and leg pain.  There are no red flags in patient history or on physical exam. Physical exam reassuring for lack of neurologic compromise.  Symptoms and physical exam findings consistent with right greater trochanteric bursitis, ITB syndrome and myofascial pain, underlying lumbar spondylosis and gluteal weakness likely contributing. Offered injections,  pharmacologic agents however patient kindly declined due to personal preference to avoid such treatments. Will work on strengthening with PT and referral for OMT.      1. Patient Education: Extensive time was spent educating the patient on relevant anatomy, clinical findings and imaging, as well as discussing the potential diagnoses as discussed above.      2. Pharmacology: Offered antiinflammatory and neuromodulating agents, patient kindly declined at this time. Pt was also encouraged to use ice/heat and massage for flare-ups. Discussed turmeric and curcumin supplementation as OTC anti-inflammatory, patient agreeable.     3. Exercise: The patient was given a prescription for PT to have a refresher course regarding core strengthening. Modalities such as US, heat/ice, TENS to decrease pain can also be employed. We discussed the importance of maintaining a daily exercise program, including stretching and strengthening. Preventative strategies were reviewed, specifically avoidance of any exercises that exacerbate pain.     4. Imaging: None needed at this time.     5. Interventional: Offered future TPI, right greater trochanteric bursa injections, referral for spine injections if patient desires. As of now patient kindly declines all injections.     6. Return to my clinic as needed. Referral for OMT clinic provided.       The patient expressed understanding and agreement with the assessment and plan. Patient encouraged to contact us should they have any questions, concerns, or any changes in symptoms.      Thank you for allowing me to participate in the care of your patient.       Aron King, DO  PM&R, PGY-2    Patient seen and discussed with the resident. I agree with the above assessment and plan.        ** Dictated with voice recognition software, please forgive any errors in grammar and/or spelling **

## 2025-01-06 NOTE — PROGRESS NOTES
"Chief complaint: Right hip and leg pain    Dear Dr. Oakes    I had the pleasure of seeing your patient, Merna Edgar, in clinic today. As you know, She is a pleasant 74 y.o. right handed female, with significant past medical history of osteoporosis, DDD, GERD, hypertension, CKD who presents for chronic right hip and leg pain.    TIMELINE OF COMPLAINT(S):    She has been having bilateral low back pain on and off for the last 15 to 20 years.  She will get occasional back spasms, the right has always been worse than the left.  There was no inciting event or trauma at the initiation of her pain.  She has been \"dealing with the pain\" for these several years and learning to live with the pain.  Her back pain is manageable, however her right hip and leg pain is of most concern to her today and she wants to know the cause of the pain.    She has had right hip pain for the past 5 years.  There was no inciting event or trauma that she can recall.  She is very active.  She does home exercises and is active 6 times a week, but works on her own exercises not specific to the hip.  She has noticed some improvement in hip pain with exercise, but notices that the back pain has improved significantly.  She has also been going to physical therapy on and off for several years, currently going once per week.  She has been told by PT that her hips are misaligned. Stretching and traction helps with her hip pain.      She has previously seen Dr. Ortiz and 2019 for her ongoing right hip and lower back pain.  At this time she was offered physical therapy, medications and injections.  She was offered NSAIDs, gabapentin, Tylenol, meloxicam, gabapentin however has not taken any medication due to patient preferences.  She has also declined any injections.  She has had hip x-rays and a recent lumbar MRI performed.     Note from primary care physician Dr. Oakes from 9/9/2024 personally reviewed today.  Patient reported increasing right leg pain " with chronic low back pain.  Repeat MRI and referral to PMR.  Lumbar MRI completed 9/18.    Note from primary care physician Dr. Oakes from 1/25/2024 personally reviewed today.  For management of lumbar spinal stenosis, recommended gabapentin as needed and Mobic.    Pain:  LOCATION- Right low back, hip and leg pain  RADIATION- Radiation to lateral thigh into knee, can sometimes radiate to anterior thigh  CONSTANT or INTERMITTENT- Intermittent   SEVERITY/QUANTITY- 5  QUALITY- aching and burning  WEAKNESS- No  NUMBNESS/TINGLING- No  ASSOCIATED WITH-inability to walk long distances  EXACERBATED BY- Night time, walking long distances  BETTER WITH- Change position, stretching and deep massage with PT has helped; CBD cream  TRIED- CBD cream, heating pads      Anti-Inflammatories: (Patient has CKD) ibuprofen (has not tried), previously prescribed meloxicam (has not tried)      Muscle relaxants:      Anti-depressants:       Neuroleptics: Previously prescribed gabapentin (has not tried)      LDN:    PHYSICAL THERAPY: Yes, currently going once per week, gym 6 days per week, raquel twice a week, 1 day aqua aerobics, 1 week abdominal yoga, 1 day per week core/aerobics/weights.  CHIROPRACTIC MANIPULATION: No  TENS unit: No  ACUPUNCTURE TREATMENTS: No  DEEP TISSUE MASSAGE THERAPY: No  OSTEOPATHIC MANIPULATION THERAPY: No    EMG/NCS: No    INJECTIONS: No      IMAGING: Yes    === 09/18/24 ===  MR LUMBAR SPINE WO CONTRAST  - Impression -  Multilevel degenerative disc disease and facet arthrosis without  significant spinal canal stenosis. Varying degrees of neural  foraminal narrowing most pronounced at L5-S1 with moderate to severe  bilateral neural foraminal stenosis.    === 12/19/2019 ===  XR hip Right 2-3 view  Bones: The bones appear intact.  Joints: The joints are maintained.  Soft tissues: Unremarkable.  IMPRESSION:  Normal exam.    === 12/19/2019 ====  MR Right hip  IMPRESSION:  1. Tearing of the anterior and anterior/superior  labrum.  2. Bilateral trochanteric bursitis.       Lab Results   Component Value Date    HGBA1C 5.4 09/22/2022       FUNCTIONAL HISTORY: The patient is independent in all ADLs, mobility, and driving. The patient does not use any assistive device.    SH:  Lives in: [place]  Lives with: Spouse  Occupation: Retired  Tobacco: No  Alcohol: No  Drugs: No  __________________________________    ROS: The patient denies any bowel or bladder incontinence/accidents, night sweats, fevers, chills, recent significant weight loss. A 14 point review of systems was reviewed with the patient and is as above and otherwise negative.  ROS questionnaire positive for joint pain, back pain, sleep disturbances    Physical Exam  Constitutional:           Comments: Right hip and leg pain.          PHYSICAL EXAM    GEN - Alert, well-developed, well-nourished, no acute distress  PSYCH - Cooperative, appropriate mood and affect  HEENT - NC/AT  RESP - Non-labored respirations, equal expansion  CV - warm and well-perfused, No cyanosis or edema in extremities. DP pulses 2+ bilaterally  ABD- soft, ND  SKIN - No rash.    BACK/SPINE - Mild thoracolumbar scoliosis. no erythema, edema, or swelling.  Mild right sided lumbar pain with sidebending. No pain with extension, flexion, rotation. No pain with facet loading. No tenderness of lumbosacral spinous processes. Mild tenderness over the right lumbar paraspinal muscles. No tenderness over the iliolumbar ligaments bilaterally.  Moderate and reproducible tenderness over the right greater trochanter, glut medius and proximal IT band.  No TTP of bilateral PSIS, sacral sulcus, piriformis.  Sacral compression negative bilaterally. Straight leg raise negative bilaterally.     HIPS/PELVIS - Symmetric in standing and lying. Passive hip flexion, internal rotation, and external rotation within functional normal limits bilaterally without provocation of pain symptoms except with external rotation which reproduces  lateral hip pain. No tenderness over iliopsoas tendon. FABERs on right reproduced pain near greater trochanter. Negative hip clicking. Negative hip impingement test. Negative log roll. Negative resisted active SLR. No pain with deep hip flexion. Fred positive on right.    NEURO -   LE strength 5/5 -  including hip flexors, knee flexors, knee extensors, ankle dorsiflexors, ankle plantar flexors, and EHL   Sensation - intact to light touch in bilateral lower extremities.   Reflexes - Brisk 3+ biceps, brachioradialis, triceps, 2+ patellar and Achilles reflexes bilaterally No Clonus, No Babinski, Franks's negative bilaterally  GAIT - Normal base, normal stride length, non-antalgic. Able to toe walk and heel walk without difficulty. Able to perform tandem gait without difficulty.    IMPRESSION:    This is a pleasant 74 y.o. right handed female, with significant past medical history of osteoporosis, DDD, GERD, hypertension, CKD who presents for chronic right hip and leg pain.  There are no red flags in patient history or on physical exam. Physical exam reassuring for lack of neurologic compromise.  Symptoms and physical exam findings consistent with right greater trochanteric bursitis, ITB syndrome and myofascial pain, underlying lumbar spondylosis and gluteal weakness likely contributing. Offered injections, pharmacologic agents however patient kindly declined due to personal preference to avoid such treatments. Will work on strengthening with PT and referral for OMT.      1. Patient Education: Extensive time was spent educating the patient on relevant anatomy, clinical findings and imaging, as well as discussing the potential diagnoses as discussed above.      2. Pharmacology: Offered antiinflammatory and neuromodulating agents, patient kindly declined at this time. Pt was also encouraged to use ice/heat and massage for flare-ups. Discussed turmeric and curcumin supplementation as OTC anti-inflammatory, patient  agreeable.     3. Exercise: The patient was given a prescription for PT to have a refresher course regarding core strengthening. Modalities such as US, heat/ice, TENS to decrease pain can also be employed. We discussed the importance of maintaining a daily exercise program, including stretching and strengthening. Preventative strategies were reviewed, specifically avoidance of any exercises that exacerbate pain.     4. Imaging: None needed at this time.     5. Interventional: Offered future TPI, right greater trochanteric bursa injections, referral for spine injections if patient desires. As of now patient kindly declines all injections.     6. Return to my clinic as needed. Referral for OMT clinic provided.       The patient expressed understanding and agreement with the assessment and plan. Patient encouraged to contact us should they have any questions, concerns, or any changes in symptoms.      Thank you for allowing me to participate in the care of your patient.       Aron King, DO  PM&R, PGY-2    Patient seen and discussed with the resident. I agree with the above assessment and plan.        ** Dictated with voice recognition software, please forgive any errors in grammar and/or spelling **

## 2025-03-24 ENCOUNTER — APPOINTMENT (OUTPATIENT)
Dept: OBSTETRICS AND GYNECOLOGY | Facility: CLINIC | Age: 75
End: 2025-03-24
Payer: MEDICARE

## 2025-03-24 VITALS
BODY MASS INDEX: 28.88 KG/M2 | DIASTOLIC BLOOD PRESSURE: 78 MMHG | HEART RATE: 102 BPM | WEIGHT: 163 LBS | HEIGHT: 63 IN | SYSTOLIC BLOOD PRESSURE: 182 MMHG

## 2025-03-24 DIAGNOSIS — Z01.419 ENCOUNTER FOR GYNECOLOGICAL EXAMINATION WITHOUT ABNORMAL FINDING: Primary | ICD-10-CM

## 2025-03-24 DIAGNOSIS — N89.8 VAGINAL DISCHARGE: ICD-10-CM

## 2025-03-24 PROCEDURE — 3008F BODY MASS INDEX DOCD: CPT | Performed by: OBSTETRICS & GYNECOLOGY

## 2025-03-24 PROCEDURE — 3078F DIAST BP <80 MM HG: CPT | Performed by: OBSTETRICS & GYNECOLOGY

## 2025-03-24 PROCEDURE — 1159F MED LIST DOCD IN RCRD: CPT | Performed by: OBSTETRICS & GYNECOLOGY

## 2025-03-24 PROCEDURE — 3077F SYST BP >= 140 MM HG: CPT | Performed by: OBSTETRICS & GYNECOLOGY

## 2025-03-24 PROCEDURE — 1036F TOBACCO NON-USER: CPT | Performed by: OBSTETRICS & GYNECOLOGY

## 2025-03-24 PROCEDURE — G0101 CA SCREEN;PELVIC/BREAST EXAM: HCPCS | Performed by: OBSTETRICS & GYNECOLOGY

## 2025-03-24 NOTE — PROGRESS NOTES
Subjective   Merna Edgar is a 74 y.o. female here for GYN care.  She has no postmenopausal bleeding but mentions a discharge.  She feels it is white in color but no odor.  It is intermittent with occasional itchiness.  It has been present about 2 months.    There is no dysuria or change in bowel habits.  She is current on her colonoscopy.    Her bone density in 2024 showed osteopenia, T-score -2.3.    Personal health questionnaire reviewed: yes.     Gynecologic History  No LMP recorded (lmp unknown). Patient is postmenopausal.  Contraception: post menopausal status  Last Pap: 2020. Results were: normal  Last mammogram: 12/3/24. Results were: normal    Obstetric History  OB History    Para Term  AB Living   2 2 2         SAB IAB Ectopic Multiple Live Births                  # Outcome Date GA Lbr Benito/2nd Weight Sex Type Anes PTL Lv   2 Term            1 Term                Objective   Constitutional: Alert and in no acute distress. Well developed, well nourished.   Head and Face: Head and face: Normal.    Eyes: Normal external exam - nonicteric sclera, extraocular movements intact (EOMI) and no ptosis.   Neck: No neck asymmetry. Supple. Thyroid not enlarged and there were no palpable thyroid nodules.    Pulmonary: No respiratory distress.   Chest: Breasts: Normal appearance, no nipple discharge and no skin changes. Palpation of breasts and axillae: No palpable mass and no axillary lymphadenopathy.   Abdomen: Soft nontender; no abdominal mass palpated. No organomegaly. No hernias.   Genitourinary: External genitalia: Normal. No inguinal lymphadenopathy. Bartholin's Urethral and Skenes Glands: Normal. Urethra: Normal.  Bladder: Normal on palpation. Vagina: Normal.  A small amount of yellow-tinged discharge is noted in vault.  Swab collected for BV and yeast.  Cervix: Normal.  Uterus: Normal.  Right Adnexa/parametria: Normal.  Left Adnexa/parametria: Normal.  Inspection of Perianal Area:  Normal.   Musculoskeletal: No joint swelling seen, normal movements of all extremities.   Skin: Normal skin color and pigmentation, normal skin turgor, and no rash.   Neurologic: Non-focal. Grossly intact.   Psychiatric: Alert and oriented x 3. Affect normal to patient baseline. Mood: Appropriate.  Physical Exam     Assessment/Plan   This is a 74-year-old female with a normal exam.    There is a small amount of vaginal discharge which she notices intermittently.  A swab was sent for BV and yeast.    No Pap smear was sent, she is HPV negative in 2020 and no further Pap smears are needed.   Her mammogram is monitored with the breast specialist.   I will see her in 2 years, or sooner as needed.    Education reviewed: self breast exams.

## 2025-03-25 DIAGNOSIS — N89.8 VAGINAL DISCHARGE: Primary | ICD-10-CM

## 2025-03-25 LAB — BV SCORE VAG QL: NORMAL

## 2025-03-25 RX ORDER — METRONIDAZOLE 500 MG/1
500 TABLET ORAL 2 TIMES DAILY
Qty: 14 TABLET | Refills: 0 | Status: SHIPPED | OUTPATIENT
Start: 2025-03-25 | End: 2025-04-01

## 2025-05-15 ENCOUNTER — APPOINTMENT (OUTPATIENT)
Dept: PRIMARY CARE | Facility: CLINIC | Age: 75
End: 2025-05-15
Payer: MEDICARE

## 2025-05-15 ENCOUNTER — TELEPHONE (OUTPATIENT)
Dept: PRIMARY CARE | Facility: CLINIC | Age: 75
End: 2025-05-15

## 2025-05-15 DIAGNOSIS — I10 PRIMARY HYPERTENSION: Primary | ICD-10-CM

## 2025-05-15 RX ORDER — LOSARTAN POTASSIUM AND HYDROCHLOROTHIAZIDE 25; 100 MG/1; MG/1
1 TABLET ORAL DAILY
Qty: 30 TABLET | Refills: 0 | Status: SHIPPED | OUTPATIENT
Start: 2025-05-15 | End: 2025-11-11

## 2025-06-05 ENCOUNTER — APPOINTMENT (OUTPATIENT)
Dept: PRIMARY CARE | Facility: CLINIC | Age: 75
End: 2025-06-05
Payer: MEDICARE

## 2025-06-05 VITALS
HEART RATE: 94 BPM | BODY MASS INDEX: 28.87 KG/M2 | WEIGHT: 163 LBS | DIASTOLIC BLOOD PRESSURE: 78 MMHG | SYSTOLIC BLOOD PRESSURE: 133 MMHG | OXYGEN SATURATION: 96 %

## 2025-06-05 DIAGNOSIS — E78.5 BORDERLINE HYPERLIPIDEMIA: ICD-10-CM

## 2025-06-05 DIAGNOSIS — I10 BENIGN ESSENTIAL HYPERTENSION: Primary | ICD-10-CM

## 2025-06-05 DIAGNOSIS — N18.31 CHRONIC RENAL IMPAIRMENT, STAGE 3A (MULTI): ICD-10-CM

## 2025-06-05 DIAGNOSIS — M81.0 POST-MENOPAUSAL OSTEOPOROSIS: ICD-10-CM

## 2025-06-05 DIAGNOSIS — I10 PRIMARY HYPERTENSION: ICD-10-CM

## 2025-06-05 DIAGNOSIS — R73.9 ELEVATED BLOOD SUGAR: ICD-10-CM

## 2025-06-05 PROCEDURE — 1160F RVW MEDS BY RX/DR IN RCRD: CPT | Performed by: INTERNAL MEDICINE

## 2025-06-05 PROCEDURE — 3075F SYST BP GE 130 - 139MM HG: CPT | Performed by: INTERNAL MEDICINE

## 2025-06-05 PROCEDURE — 3078F DIAST BP <80 MM HG: CPT | Performed by: INTERNAL MEDICINE

## 2025-06-05 PROCEDURE — 1159F MED LIST DOCD IN RCRD: CPT | Performed by: INTERNAL MEDICINE

## 2025-06-05 PROCEDURE — 1036F TOBACCO NON-USER: CPT | Performed by: INTERNAL MEDICINE

## 2025-06-05 PROCEDURE — 99214 OFFICE O/P EST MOD 30 MIN: CPT | Performed by: INTERNAL MEDICINE

## 2025-06-05 RX ORDER — LOSARTAN POTASSIUM AND HYDROCHLOROTHIAZIDE 25; 100 MG/1; MG/1
1 TABLET ORAL DAILY
Qty: 90 TABLET | Refills: 0 | Status: SHIPPED | OUTPATIENT
Start: 2025-06-05 | End: 2025-12-02

## 2025-06-05 RX ORDER — HYDRALAZINE HYDROCHLORIDE 10 MG/1
10 TABLET, FILM COATED ORAL 2 TIMES DAILY
Qty: 180 TABLET | Refills: 0 | Status: SHIPPED | OUTPATIENT
Start: 2025-06-05

## 2025-06-05 ASSESSMENT — ENCOUNTER SYMPTOMS
PALPITATIONS: 0
DIZZINESS: 0
HEADACHES: 0
CHEST TIGHTNESS: 0
SHORTNESS OF BREATH: 0
COUGH: 0
WHEEZING: 0
FATIGUE: 0
ACTIVITY CHANGE: 0
LIGHT-HEADEDNESS: 0

## 2025-06-05 NOTE — PROGRESS NOTES
Subjective   Patient ID: Merna Edgar is a 74 y.o. female who presents for Blood Pressure Check.    Ms. Edgar comes in today for a blood pressure check.  Blood pressure at her gynecologist appointment was quite elevated so she has started checking this a bit more regularly.  This has been quite varied, sometimes normal in the 130s, sometimes elevated into the 170's.  She will occasionally feel a twinge of chest pain that lasts just for a few seconds, mainly when her blood pressure is elevated, she is not certain whether this is actual chest pain or more anxiety and stress about the blood pressure being elevated, there has never been any sustained chest pressure nor changes in her breathing associated with this.  She admits that she has not been taking her hydralazine regularly, she did try to decrease this to once daily, she was not certain whether this was helping her blood pressure so she has stopped taking this overall.  She denies any headaches nor dizziness.  She has her wellness visit in August but would like to do her blood work prior to that if possible.  She does need refills on her medications.  She did not bring her monitor in with her today.  Blood pressure today looks outstanding.         Review of Systems   Constitutional:  Negative for activity change and fatigue.   Respiratory:  Negative for cough, chest tightness, shortness of breath and wheezing.    Cardiovascular:  Negative for chest pain, palpitations and leg swelling.   Neurological:  Negative for dizziness, light-headedness and headaches.       Objective   /78   Pulse 94   Wt 73.9 kg (163 lb)   LMP  (LMP Unknown)   SpO2 96%   BMI 28.87 kg/m²     Physical Exam  Constitutional:       Appearance: Normal appearance.   Cardiovascular:      Rate and Rhythm: Normal rate and regular rhythm.      Pulses: Normal pulses.      Heart sounds: Murmur (2/6 SM LSB) heard.   Pulmonary:      Effort: Pulmonary effort is normal. No respiratory  distress.   Neurological:      Mental Status: She is alert.         Assessment/Plan   1.  Hypertension: Agree with starting back on hydralazine 10 mg twice daily in addition to her losartan.  Refills provided on both.  Due for labs, orders placed.  She will come back fasting for this.  Have also asked that she bring her blood pressure monitor in with her at her next visit.  2.  Healthcare maintenance: Coming in later in the summer for her wellness visit.  All labs in place based on diagnostic needs.    Greater than 30 minutes was spent with the patient today discussing chart review, current symptomatology, treatment options, and documentation, greater than 50% of time spent on care coordination.

## 2025-06-05 NOTE — PATIENT INSTRUCTIONS
Please start back on your hydralazine 10 mg twice daily and continue on losartan.  All orders for lab work have been placed, please have these done on a fasting basis.  Please remember to bring your blood pressure cuff in with you when you come in for your wellness visit.  If you have any questions, please let us know.

## 2025-06-07 LAB
25(OH)D3+25(OH)D2 SERPL-MCNC: 75 NG/ML (ref 30–100)
ALBUMIN SERPL-MCNC: 4.3 G/DL (ref 3.6–5.1)
ALBUMIN/CREAT UR: 8 MG/G CREAT
ALP SERPL-CCNC: 79 U/L (ref 37–153)
ALT SERPL-CCNC: 11 U/L (ref 6–29)
ANION GAP SERPL CALCULATED.4IONS-SCNC: 11 MMOL/L (CALC) (ref 7–17)
AST SERPL-CCNC: 13 U/L (ref 10–35)
BASOPHILS # BLD AUTO: 58 CELLS/UL (ref 0–200)
BASOPHILS NFR BLD AUTO: 0.8 %
BILIRUB SERPL-MCNC: 0.7 MG/DL (ref 0.2–1.2)
BUN SERPL-MCNC: 17 MG/DL (ref 7–25)
CALCIUM SERPL-MCNC: 10.2 MG/DL (ref 8.6–10.4)
CHLORIDE SERPL-SCNC: 100 MMOL/L (ref 98–110)
CHOLEST SERPL-MCNC: 223 MG/DL
CHOLEST/HDLC SERPL: 3 (CALC)
CO2 SERPL-SCNC: 28 MMOL/L (ref 20–32)
CREAT SERPL-MCNC: 1.04 MG/DL (ref 0.6–1)
CREAT UR-MCNC: 156 MG/DL (ref 20–275)
EGFRCR SERPLBLD CKD-EPI 2021: 56 ML/MIN/1.73M2
EOSINOPHIL # BLD AUTO: 88 CELLS/UL (ref 15–500)
EOSINOPHIL NFR BLD AUTO: 1.2 %
ERYTHROCYTE [DISTWIDTH] IN BLOOD BY AUTOMATED COUNT: 12.9 % (ref 11–15)
EST. AVERAGE GLUCOSE BLD GHB EST-MCNC: 114 MG/DL
EST. AVERAGE GLUCOSE BLD GHB EST-SCNC: 6.3 MMOL/L
GLUCOSE SERPL-MCNC: 98 MG/DL (ref 65–99)
HBA1C MFR BLD: 5.6 %
HCT VFR BLD AUTO: 44.2 % (ref 35–45)
HDLC SERPL-MCNC: 75 MG/DL
HGB BLD-MCNC: 14.4 G/DL (ref 11.7–15.5)
LDLC SERPL CALC-MCNC: 128 MG/DL (CALC)
LYMPHOCYTES # BLD AUTO: 1562 CELLS/UL (ref 850–3900)
LYMPHOCYTES NFR BLD AUTO: 21.4 %
MCH RBC QN AUTO: 27.9 PG (ref 27–33)
MCHC RBC AUTO-ENTMCNC: 32.6 G/DL (ref 32–36)
MCV RBC AUTO: 85.7 FL (ref 80–100)
MICROALBUMIN UR-MCNC: 1.3 MG/DL
MONOCYTES # BLD AUTO: 599 CELLS/UL (ref 200–950)
MONOCYTES NFR BLD AUTO: 8.2 %
NEUTROPHILS # BLD AUTO: 4993 CELLS/UL (ref 1500–7800)
NEUTROPHILS NFR BLD AUTO: 68.4 %
NONHDLC SERPL-MCNC: 148 MG/DL (CALC)
PLATELET # BLD AUTO: 241 THOUSAND/UL (ref 140–400)
PMV BLD REES-ECKER: 10 FL (ref 7.5–12.5)
POTASSIUM SERPL-SCNC: 4 MMOL/L (ref 3.5–5.3)
PROT SERPL-MCNC: 6.8 G/DL (ref 6.1–8.1)
RBC # BLD AUTO: 5.16 MILLION/UL (ref 3.8–5.1)
SODIUM SERPL-SCNC: 139 MMOL/L (ref 135–146)
TRIGL SERPL-MCNC: 102 MG/DL
TSH SERPL-ACNC: 1.77 MIU/L (ref 0.4–4.5)
VIT B12 SERPL-MCNC: 522 PG/ML (ref 200–1100)
WBC # BLD AUTO: 7.3 THOUSAND/UL (ref 3.8–10.8)

## 2025-06-12 ENCOUNTER — APPOINTMENT (OUTPATIENT)
Dept: PRIMARY CARE | Facility: CLINIC | Age: 75
End: 2025-06-12
Payer: MEDICARE

## 2025-07-03 ENCOUNTER — APPOINTMENT (OUTPATIENT)
Dept: PRIMARY CARE | Facility: CLINIC | Age: 75
End: 2025-07-03
Payer: MEDICARE

## 2025-08-04 ENCOUNTER — APPOINTMENT (OUTPATIENT)
Dept: PRIMARY CARE | Facility: CLINIC | Age: 75
End: 2025-08-04
Payer: MEDICARE

## 2025-08-04 VITALS
WEIGHT: 162.2 LBS | BODY MASS INDEX: 28.74 KG/M2 | HEART RATE: 96 BPM | OXYGEN SATURATION: 97 % | DIASTOLIC BLOOD PRESSURE: 84 MMHG | SYSTOLIC BLOOD PRESSURE: 146 MMHG | HEIGHT: 63 IN

## 2025-08-04 DIAGNOSIS — R01.1 CARDIAC MURMUR: ICD-10-CM

## 2025-08-04 DIAGNOSIS — E78.5 BORDERLINE HYPERLIPIDEMIA: ICD-10-CM

## 2025-08-04 DIAGNOSIS — Z00.00 ROUTINE GENERAL MEDICAL EXAMINATION AT HEALTH CARE FACILITY: Primary | ICD-10-CM

## 2025-08-04 DIAGNOSIS — M81.0 POST-MENOPAUSAL OSTEOPOROSIS: ICD-10-CM

## 2025-08-04 DIAGNOSIS — M76.31 ILIOTIBIAL BAND SYNDROME OF RIGHT SIDE: ICD-10-CM

## 2025-08-04 DIAGNOSIS — M48.062 SPINAL STENOSIS OF LUMBAR REGION WITH NEUROGENIC CLAUDICATION: ICD-10-CM

## 2025-08-04 DIAGNOSIS — M79.18 MYOFASCIAL PAIN: ICD-10-CM

## 2025-08-04 DIAGNOSIS — M47.816 LUMBAR SPONDYLOSIS: ICD-10-CM

## 2025-08-04 DIAGNOSIS — I10 PRIMARY HYPERTENSION: ICD-10-CM

## 2025-08-04 DIAGNOSIS — I10 BENIGN ESSENTIAL HYPERTENSION: ICD-10-CM

## 2025-08-04 PROCEDURE — 1159F MED LIST DOCD IN RCRD: CPT | Performed by: INTERNAL MEDICINE

## 2025-08-04 PROCEDURE — 99214 OFFICE O/P EST MOD 30 MIN: CPT | Performed by: INTERNAL MEDICINE

## 2025-08-04 PROCEDURE — 3008F BODY MASS INDEX DOCD: CPT | Performed by: INTERNAL MEDICINE

## 2025-08-04 PROCEDURE — 1160F RVW MEDS BY RX/DR IN RCRD: CPT | Performed by: INTERNAL MEDICINE

## 2025-08-04 PROCEDURE — 93000 ELECTROCARDIOGRAM COMPLETE: CPT | Performed by: INTERNAL MEDICINE

## 2025-08-04 PROCEDURE — 1123F ACP DISCUSS/DSCN MKR DOCD: CPT | Performed by: INTERNAL MEDICINE

## 2025-08-04 PROCEDURE — 1170F FXNL STATUS ASSESSED: CPT | Performed by: INTERNAL MEDICINE

## 2025-08-04 PROCEDURE — 1036F TOBACCO NON-USER: CPT | Performed by: INTERNAL MEDICINE

## 2025-08-04 PROCEDURE — G0439 PPPS, SUBSEQ VISIT: HCPCS | Performed by: INTERNAL MEDICINE

## 2025-08-04 PROCEDURE — 3079F DIAST BP 80-89 MM HG: CPT | Performed by: INTERNAL MEDICINE

## 2025-08-04 PROCEDURE — 3077F SYST BP >= 140 MM HG: CPT | Performed by: INTERNAL MEDICINE

## 2025-08-04 RX ORDER — LOSARTAN POTASSIUM AND HYDROCHLOROTHIAZIDE 25; 100 MG/1; MG/1
1 TABLET ORAL DAILY
Qty: 90 TABLET | Refills: 1 | Status: SHIPPED | OUTPATIENT
Start: 2025-08-04 | End: 2026-01-31

## 2025-08-04 ASSESSMENT — ENCOUNTER SYMPTOMS
CONSTIPATION: 0
DEPRESSION: 0
DIFFICULTY URINATING: 0
MYALGIAS: 1
HEADACHES: 0
VOICE CHANGE: 0
RHINORRHEA: 0
APPETITE CHANGE: 0
DIZZINESS: 0
VOMITING: 0
DIARRHEA: 0
PALPITATIONS: 0
SINUS PRESSURE: 0
SHORTNESS OF BREATH: 0
DECREASED CONCENTRATION: 0
OCCASIONAL FEELINGS OF UNSTEADINESS: 0
LIGHT-HEADEDNESS: 0
SINUS PAIN: 0
HYPERACTIVE: 0
WHEEZING: 0
EYE ITCHING: 0
ARTHRALGIAS: 1
HEMATURIA: 0
FEVER: 0
EYE DISCHARGE: 0
WEAKNESS: 0
DYSURIA: 0
SORE THROAT: 0
COUGH: 0
ABDOMINAL DISTENTION: 0
FREQUENCY: 0
NECK PAIN: 0
ACTIVITY CHANGE: 0
NECK STIFFNESS: 0
DYSPHORIC MOOD: 0
COLOR CHANGE: 0
NAUSEA: 0
CHILLS: 0
FATIGUE: 0
LOSS OF SENSATION IN FEET: 0
SLEEP DISTURBANCE: 0
BRUISES/BLEEDS EASILY: 0
ADENOPATHY: 0
NERVOUS/ANXIOUS: 0
BACK PAIN: 1
CHEST TIGHTNESS: 0
SEIZURES: 0
ABDOMINAL PAIN: 0

## 2025-08-04 ASSESSMENT — PATIENT HEALTH QUESTIONNAIRE - PHQ9
SUM OF ALL RESPONSES TO PHQ9 QUESTIONS 1 AND 2: 0
2. FEELING DOWN, DEPRESSED OR HOPELESS: NOT AT ALL
1. LITTLE INTEREST OR PLEASURE IN DOING THINGS: NOT AT ALL

## 2025-08-04 ASSESSMENT — ACTIVITIES OF DAILY LIVING (ADL)
DRESSING: INDEPENDENT
MANAGING_FINANCES: INDEPENDENT
GROCERY_SHOPPING: INDEPENDENT
TAKING_MEDICATION: INDEPENDENT
BATHING: INDEPENDENT
DOING_HOUSEWORK: INDEPENDENT

## 2025-08-04 NOTE — PROGRESS NOTES
Subjective   Reason for Visit: Merna Edgar is an 74 y.o. female patient comes in today for comprehensive follow-up of medical conditions in conjunction with annual wellness visit    Ms. Edgar comes in today for comprehensive follow-up of medical conditions in conjunction with annual wellness visit, dictated in a separate subsection.  Please refer to that portion of the note for details on healthcare maintenance and screening studies.  She is doing reasonably well.  Blood pressure is doing better at home but her blood pressure cuff is not calibrating well with ours, consistently reading 15-20 points higher systolically.  Repeat blood pressure after some time here is actually outstanding at 134/68.  She feels well.  She has significant back pain which is chronic for her, worsening SI joint pain.  She is trying some massage therapy, but if this is not beneficial, she may reach out for physical therapy again which is reasonable.  She does have some occasional fleeting chest pains on her left side, these are very short, lasting only a few seconds, pinpoint, not related to exertion.  She has had a reassuring calcium scoring scan recently and has not noticed any exertional chest pain.  She goes to the gym regularly, exercising 1 hour daily, with no chest pain at that time.  She did have some lower extremity edema in her feet after a lengthy trip earlier this year.  This resolved after a few days back to her baseline, no redness, warmth, nor residual swelling.  She does have myalgias especially in the evening.  She does not stretch much before exercising and knows that she could improve with this.  She does try to stay hydrated.  She denies any changes in her breathing, nausea, vomiting, nor changes in bowel nor bladder habits.        Patient Care Team:  Luz Maria Oakes MD as PCP - General  Luz Maria Oakes MD as PCP [facility]ributed [facility]     Review of Systems   Constitutional:  Negative for activity change, appetite  "change, chills, fatigue and fever.   HENT:  Negative for congestion, ear pain, postnasal drip, rhinorrhea, sinus pressure, sinus pain, sneezing, sore throat, tinnitus and voice change.    Eyes:  Negative for discharge, itching and visual disturbance.   Respiratory:  Negative for cough, chest tightness, shortness of breath and wheezing.    Cardiovascular:  Negative for chest pain, palpitations and leg swelling.   Gastrointestinal:  Negative for abdominal distention, abdominal pain, constipation, diarrhea, nausea and vomiting.   Endocrine: Negative for cold intolerance, heat intolerance and polyuria.   Genitourinary:  Negative for difficulty urinating, dysuria, frequency, hematuria, urgency, vaginal bleeding and vaginal discharge.   Musculoskeletal:  Positive for arthralgias, back pain and myalgias. Negative for neck pain and neck stiffness.   Skin:  Negative for color change, pallor and rash.   Allergic/Immunologic: Negative for environmental allergies, food allergies and immunocompromised state.   Neurological:  Negative for dizziness, seizures, syncope, weakness, light-headedness and headaches.   Hematological:  Negative for adenopathy. Does not bruise/bleed easily.   Psychiatric/Behavioral:  Negative for behavioral problems, decreased concentration, dysphoric mood and sleep disturbance. The patient is not nervous/anxious and is not hyperactive.        Objective   Vitals:  /84   Pulse 96   Ht 1.6 m (5' 3\")   Wt 73.6 kg (162 lb 3.2 oz)   LMP  (LMP Unknown)   SpO2 97%   BMI 28.73 kg/m²       Physical Exam  Constitutional:       General: She is not in acute distress.     Appearance: Normal appearance. She is well-developed. She is not ill-appearing.   HENT:      Head: Normocephalic.      Right Ear: Tympanic membrane, ear canal and external ear normal.      Left Ear: Tympanic membrane, ear canal and external ear normal.      Nose: Nose normal. No congestion.      Mouth/Throat:      Mouth: Mucous membranes " are moist.      Pharynx: Oropharynx is clear. No oropharyngeal exudate or posterior oropharyngeal erythema.     Eyes:      General: Lids are normal. No scleral icterus.     Extraocular Movements: Extraocular movements intact.      Conjunctiva/sclera: Conjunctivae normal.      Pupils: Pupils are equal, round, and reactive to light.     Neck:      Vascular: No carotid bruit.     Cardiovascular:      Rate and Rhythm: Normal rate and regular rhythm.      Pulses: Normal pulses.      Heart sounds: Murmur (3/6 SM heard throughout) heard.      No gallop.   Pulmonary:      Effort: Pulmonary effort is normal. No respiratory distress.      Breath sounds: No wheezing, rhonchi or rales.   Chest:      Comments: Deferred to specialist  Abdominal:      General: Bowel sounds are normal. There is no distension.      Palpations: Abdomen is soft. There is no mass.      Tenderness: There is no abdominal tenderness. There is no guarding or rebound.   Genitourinary:     Comments: Deferred    Musculoskeletal:         General: No swelling or signs of injury.      Cervical back: Normal range of motion and neck supple. No tenderness.      Right lower leg: No edema.      Left lower leg: No edema.   Lymphadenopathy:      Cervical: No cervical adenopathy.     Skin:     General: Skin is warm and dry.      Coloration: Skin is not pale.      Findings: No bruising or rash.     Neurological:      General: No focal deficit present.      Mental Status: She is alert and oriented to person, place, and time.      Cranial Nerves: No cranial nerve deficit.      Motor: No weakness.      Coordination: Coordination normal.      Gait: Gait normal.     Psychiatric:         Mood and Affect: Mood normal.         Behavior: Behavior normal.         Thought Content: Thought content normal.         Judgment: Judgment normal.         Assessment & Plan  Primary hypertension    Orders:    losartan-hydrochlorothiazide (Hyzaar) 100-25 mg tablet; Take 1 tablet by mouth once  daily.    Full age-appropriate comprehensive physical exam and health care maintenance performed and discussed today.  Routine safety and preventative measures discussed including self breast exam, seatbelt use, no drinking and driving, no texting and driving, abstinence or cessation of tobacco use, routine dental and vision exams, healthy diet and regular exercise.    Labs reviewed in detail, outstanding.  No change in therapy.  Mammogram due in December, followed by breast specialist.  DEXA up-to-date from spring 2024, repeat next year  EKG as above.  Colonoscopy remains up-to-date 2017, repeat 10 years total.  Have counseled regarding shingles and tetanus vaccine recommendations, could consider RSV vaccine this fall as well when she turns 75.  Has completed pneumonia vaccine series.  Recommend continued annual flu shots and consider updated COVID boosters.    In addition to the above-mentioned healthcare maintenance and screening studies, the following were discussed and assessed in detail today:  1.  Hypertension: Actually on recheck today 134/68, excellent.  Continue hydralazine twice daily, losartan HCT.  BMP recently updated and stable.  Refills sent in, contact us if further refills needed.  Follow-up 6 months.  2.  Borderline hyperlipidemia: Last lab check in June outstanding.  No indication for statin therapy.  3.  Cardiac murmur: Mild aortic stenosis on echo, last updated 2024, no significant change from 2021.  4.  Lumbar stenosis with spondylosis, iliotibial band syndrome on the right: Working with massage therapy.  Likely will need physical therapy again, she will contact us when she is ready to proceed and we will place referral at that time.  5.  Osteoporosis: Last DEXA still with moderate osteopenia.  She declines treatment at this time, continues with weightbearing exercise, vitamin D supplementation.  Repeat DEXA next year if progressive, need to consider treatment.  She voices agreement.    She is  to contact us with any questions.  Otherwise, happy to see her back in 6 months for brief follow-up.

## 2025-08-04 NOTE — PATIENT INSTRUCTIONS
Lab work looked outstanding.  We will plan on a repeat bone density scan next year.  Colonoscopy will be due in 2027.  Blood pressure cuff does read a bit higher than ours, her readings look outstanding.  Please continue on current medications with no change, contact us if any additional refills needed.  If you would like to proceed with physical therapy, please let us know and we would be happy to place this referral.  We will plan on seeing you back in 6 months for follow-up.  Please contact us if you have any questions prior to that time.

## 2025-08-21 DIAGNOSIS — I10 PRIMARY HYPERTENSION: Primary | ICD-10-CM

## 2025-08-21 RX ORDER — HYDRALAZINE HYDROCHLORIDE 10 MG/1
10 TABLET, FILM COATED ORAL 2 TIMES DAILY
Qty: 180 TABLET | Refills: 1 | Status: SHIPPED | OUTPATIENT
Start: 2025-08-21

## 2026-02-02 ENCOUNTER — APPOINTMENT (OUTPATIENT)
Dept: PRIMARY CARE | Facility: CLINIC | Age: 76
End: 2026-02-02
Payer: MEDICARE

## 2026-08-25 ENCOUNTER — APPOINTMENT (OUTPATIENT)
Dept: PRIMARY CARE | Facility: CLINIC | Age: 76
End: 2026-08-25
Payer: MEDICARE